# Patient Record
Sex: FEMALE | Race: BLACK OR AFRICAN AMERICAN | NOT HISPANIC OR LATINO | URBAN - METROPOLITAN AREA
[De-identification: names, ages, dates, MRNs, and addresses within clinical notes are randomized per-mention and may not be internally consistent; named-entity substitution may affect disease eponyms.]

---

## 2019-02-01 ENCOUNTER — OUTPATIENT (OUTPATIENT)
Dept: OUTPATIENT SERVICES | Facility: HOSPITAL | Age: 29
LOS: 1 days | End: 2019-02-01
Payer: MEDICAID

## 2019-02-01 PROCEDURE — G9001: CPT

## 2019-02-11 ENCOUNTER — EMERGENCY (EMERGENCY)
Facility: HOSPITAL | Age: 29
LOS: 1 days | Discharge: HOME | End: 2019-02-11
Attending: EMERGENCY MEDICINE | Admitting: EMERGENCY MEDICINE
Payer: MEDICAID

## 2019-02-11 VITALS
SYSTOLIC BLOOD PRESSURE: 126 MMHG | DIASTOLIC BLOOD PRESSURE: 76 MMHG | HEART RATE: 91 BPM | TEMPERATURE: 98 F | RESPIRATION RATE: 16 BRPM | OXYGEN SATURATION: 100 %

## 2019-02-11 LAB
ANION GAP SERPL CALC-SCNC: 15 MMO/L — HIGH (ref 7–14)
BASOPHILS # BLD AUTO: 0.02 K/UL — SIGNIFICANT CHANGE UP (ref 0–0.2)
BASOPHILS NFR BLD AUTO: 0.3 % — SIGNIFICANT CHANGE UP (ref 0–2)
BLD GP AB SCN SERPL QL: POSITIVE — SIGNIFICANT CHANGE UP
BUN SERPL-MCNC: 11 MG/DL — SIGNIFICANT CHANGE UP (ref 7–23)
CALCIUM SERPL-MCNC: 9.6 MG/DL — SIGNIFICANT CHANGE UP (ref 8.4–10.5)
CHLORIDE SERPL-SCNC: 103 MMOL/L — SIGNIFICANT CHANGE UP (ref 98–107)
CO2 SERPL-SCNC: 22 MMOL/L — SIGNIFICANT CHANGE UP (ref 22–31)
CREAT SERPL-MCNC: 0.81 MG/DL — SIGNIFICANT CHANGE UP (ref 0.5–1.3)
EOSINOPHIL # BLD AUTO: 0.12 K/UL — SIGNIFICANT CHANGE UP (ref 0–0.5)
EOSINOPHIL NFR BLD AUTO: 1.9 % — SIGNIFICANT CHANGE UP (ref 0–6)
GLUCOSE SERPL-MCNC: 111 MG/DL — HIGH (ref 70–99)
HCG SERPL-ACNC: 1323 MIU/ML — SIGNIFICANT CHANGE UP
HCT VFR BLD CALC: 37.7 % — SIGNIFICANT CHANGE UP (ref 34.5–45)
HGB BLD-MCNC: 11.8 G/DL — SIGNIFICANT CHANGE UP (ref 11.5–15.5)
IMM GRANULOCYTES NFR BLD AUTO: 0 % — SIGNIFICANT CHANGE UP (ref 0–1.5)
LYMPHOCYTES # BLD AUTO: 1.92 K/UL — SIGNIFICANT CHANGE UP (ref 1–3.3)
LYMPHOCYTES # BLD AUTO: 31 % — SIGNIFICANT CHANGE UP (ref 13–44)
MCHC RBC-ENTMCNC: 26.8 PG — LOW (ref 27–34)
MCHC RBC-ENTMCNC: 31.3 % — LOW (ref 32–36)
MCV RBC AUTO: 85.7 FL — SIGNIFICANT CHANGE UP (ref 80–100)
MONOCYTES # BLD AUTO: 0.59 K/UL — SIGNIFICANT CHANGE UP (ref 0–0.9)
MONOCYTES NFR BLD AUTO: 9.5 % — SIGNIFICANT CHANGE UP (ref 2–14)
NEUTROPHILS # BLD AUTO: 3.55 K/UL — SIGNIFICANT CHANGE UP (ref 1.8–7.4)
NEUTROPHILS NFR BLD AUTO: 57.3 % — SIGNIFICANT CHANGE UP (ref 43–77)
NRBC # FLD: 0 K/UL — LOW (ref 25–125)
PLATELET # BLD AUTO: 303 K/UL — SIGNIFICANT CHANGE UP (ref 150–400)
PMV BLD: 10.2 FL — SIGNIFICANT CHANGE UP (ref 7–13)
POTASSIUM SERPL-MCNC: 3.9 MMOL/L — SIGNIFICANT CHANGE UP (ref 3.5–5.3)
POTASSIUM SERPL-SCNC: 3.9 MMOL/L — SIGNIFICANT CHANGE UP (ref 3.5–5.3)
RBC # BLD: 4.4 M/UL — SIGNIFICANT CHANGE UP (ref 3.8–5.2)
RBC # FLD: 13.6 % — SIGNIFICANT CHANGE UP (ref 10.3–14.5)
RH IG SCN BLD-IMP: NEGATIVE — SIGNIFICANT CHANGE UP
SODIUM SERPL-SCNC: 140 MMOL/L — SIGNIFICANT CHANGE UP (ref 135–145)
WBC # BLD: 6.2 K/UL — SIGNIFICANT CHANGE UP (ref 3.8–10.5)
WBC # FLD AUTO: 6.2 K/UL — SIGNIFICANT CHANGE UP (ref 3.8–10.5)

## 2019-02-11 PROCEDURE — 86077 PHYS BLOOD BANK SERV XMATCH: CPT

## 2019-02-11 PROCEDURE — 76830 TRANSVAGINAL US NON-OB: CPT | Mod: 26

## 2019-02-11 PROCEDURE — 99285 EMERGENCY DEPT VISIT HI MDM: CPT

## 2019-02-11 NOTE — ED ADULT NURSE NOTE - OBJECTIVE STATEMENT
Pt presents to intake, A&Ox4, ambulatory w/o assistance, here for evaluation of vaginal bleeding w/ clots since last night, pt states she is 7wks pregnant, denies soaking pads only spotting. Denies chest pain, shortness of breath, palpitations, diaphoresis, headaches, fevers, dizziness, nausea, vomiting, diarrhea, or urinary symptoms at this time. Labs drawn and sent, call bell in reach, warm blanket provided, bed in lowest position, side rails up x2. Will continue to monitor.

## 2019-02-11 NOTE — ED PROVIDER NOTE - PROGRESS NOTE DETAILS
Pt has picture of sono from [facility] showing IUP. BHCG is going down, rh negative but received rhogam at [facility]. Precautions reviewed. Nicolasa: Pt signed out to me by Dr Donovan pending GYN evaluation. Pt cleared for d/c by GYn attending. will d/c home to f/u as an outpatient.

## 2019-02-11 NOTE — ED ADULT TRIAGE NOTE - CHIEF COMPLAINT QUOTE
pt approx 8 weeks pregnant with +IUP c/o vaginal bleeding last night with clots present- pt currently denies any vaginal bleeding but wants to r/o miscarriage- denies all medical complaints at this time

## 2019-02-11 NOTE — ED PROVIDER NOTE - OBJECTIVE STATEMENT
30 y/o F with no significant PMHx presents to ED with vaginal bleeding since last night. Pt is approx. 8 weeks pregnant and states yesterday she had vaginal bleeding. Pt notes that there were blood clots present and she was bleeding more than usual. LMP December 16th. Pt denies any fever, chills , diarrhea, or other complaints. Pt is RH- and received Rhogam injection on 2/6. Pt also had blood work performed Feb 6th and hormone level was 4,135.

## 2019-02-11 NOTE — ED PROVIDER NOTE - MEDICAL DECISION MAKING DETAILS
Pt with vaginal bleeding, seen at another hospital, received rhogam, had labs, Beta HCG is going down. OB consulted.

## 2019-02-12 VITALS
RESPIRATION RATE: 16 BRPM | DIASTOLIC BLOOD PRESSURE: 65 MMHG | HEART RATE: 90 BPM | OXYGEN SATURATION: 98 % | TEMPERATURE: 98 F | SYSTOLIC BLOOD PRESSURE: 123 MMHG

## 2019-02-12 NOTE — CONSULT NOTE ADULT - SUBJECTIVE AND OBJECTIVE BOX
R2 GYN ED CONSULT NOTE    SUBJECTIVE:    28yo  w/ at 8w2d by LMP 18 presents to [facility] ED w/ VB.  Pt reports that overnight she had heavy VB w/ passage of large clots a/w abdominal cramping.  She says today her VB has mostly resolved.  She was seen on  at [facility] for spotting and at that time she was found to have bHCG 4135 and TVUS showed a 6wk sized sIUP w/ an FH.  Pt has pictures of the sonogram showing sIUP on her phone.  At that time she was found to be Rh Negative and was given Rhogam for threatened Ab.   Today the pt denies fever, chills, nausea, vomiting, diarrhea, headache, constipation, dizzyness, syncope, chest pain, palpitations, shortness of breath, dysuria, urgency, frequency, abdominal/pelvic pain.  She refuses pelvic exam and says her VB is very little.  This was a desired pregnancy.      Primary OB/GYN: none  OBH:   GYNH: denies hx of fibroids, ov cysts, abnl paps, sti  PMSH: migraines  MEDS: advil occasionally  ALL: nkda  SOCH: denies t/e/d; safe at home  FAMH: denies fam hx of breast/uterine/ovarian/colon cancer  ROS: negative except per hpi    OBJECTIVE:    VITAL SIGNS:  Vital Signs Last 24 Hrs  T(C): 36.7 (2019 19:03), Max: 36.7 (2019 19:03)  T(F): 98 (2019 19:03), Max: 98 (2019 19:03)  HR: 91 (2019 19:03) (91 - 91)  BP: 126/76 (2019 19:03) (126/76 - 126/76)  BP(mean): --  RR: 16 (2019 19:03) (16 - 16)  SpO2: 100% (2019 19:03) (100% - 100%)    CAPILLARY BLOOD GLUCOSE            PHYSICAL EXAM:  GEN: NAD, A&Ox3  CV: RRR, no m/g/r  LUNGS: CTAB  ABD: soft, NT, ND, +BS  SPECULUM:  PELVIC:  EXT: WWP, no edema/TTP    LABS:                        11.8   6.20  )-----------( 303      ( 2019 21:53 )             37.7   baso 0.3    eos 1.9    imm gran 0      lymph 31.0   mono 9.5    poly 57.3           140  |  103  |  11  ----------------------------<  111<H>  3.9   |  22  |  0.81    Ca    9.6      2019 21:53            RADIOLOGY:    ASSESSMENT:    RECOMMENDATIONS: R2 GYN ED CONSULT NOTE    SUBJECTIVE:    28yo  w/ at 8w2d by LMP 18 presents to [facility] ED w/ VB.  Pt reports that overnight she had heavy VB w/ passage of large clots a/w abdominal cramping.  She says today her VB has mostly resolved.  She was seen on  at [facility] for spotting and at that time she was found to have bHCG 4135 and TVUS showed a 6wk sized sIUP w/ an FH.  Pt has pictures of the sonogram showing sIUP on her phone.  At that time she was found to be Rh Negative and was given Rhogam for threatened Ab.   Today the pt denies fever, chills, nausea, vomiting, diarrhea, headache, constipation, dizzyness, syncope, chest pain, palpitations, shortness of breath, dysuria, urgency, frequency, abdominal/pelvic pain.  She refuses pelvic exam and says her VB is very little.  This was a desired pregnancy.      Primary OB/GYN: none  OBH:   GYNH: denies hx of fibroids, ov cysts, abnl paps, sti  PMSH: migraines  MEDS: advil occasionally  ALL: nkda  SOCH: denies t/e/d; safe at home  FAMH: denies fam hx of breast/uterine/ovarian/colon cancer  ROS: negative except per hpi    OBJECTIVE:    VITAL SIGNS:  Vital Signs Last 24 Hrs  T(C): 36.7 (2019 19:03), Max: 36.7 (2019 19:03)  T(F): 98 (2019 19:03), Max: 98 (2019 19:03)  HR: 91 (2019 19:03) (91 - 91)  BP: 126/76 (2019 19:03) (126/76 - 126/76)  BP(mean): --  RR: 16 (2019 19:03) (16 - 16)  SpO2: 100% (2019 19:03) (100% - 100%)    PHYSICAL EXAM:  GEN: NAD, A&Ox3  CV: RRR, no m/g/r  LUNGS: CTAB  ABD: soft, NT, ND, +BS  PELVIC: deferred per pt request  EXT: WWP, no edema/TTP    LABS:                        11.8   6.20  )-----------( 303      ( 2019 21:53 )             37.7   baso 0.3    eos 1.9    imm gran 0      lymph 31.0   mono 9.5    poly 57.3           140  |  103  |  11  ----------------------------<  111<H>  3.9   |  22  |  0.81    Ca    9.6      2019 21:53    HCG Quantitative, Serum (19 @ 21:53)    HCG Quantitative, Serum: 1323    Type + Screen (19 @ 21:07)    ABO Interpretation: O    Rh Interpretation: Negative    Antibody Screen: Positive      RADIOLOGY:    < from: US Transvaginal (19 @ 22:56) >  EXAM:  US TRANSVAGINAL    PROCEDURE DATE:  2019   INTERPRETATION:  CLINICAL INFORMATION: Evaluate for ectopic pregnancy. Patient is 8 weeks pregnant. Presenting with vaginal bleeding with passing of clots. Bleeding during examination. Beta-hCG was not available at the time of this dictation.  LMP: 2018  COMPARISON: None available.  TECHNIQUE:  Endovaginal pelvic sonogram only. Color and Spectral Doppler was performed.  FINDINGS:  Uterus: 7.7 x 5.5 x 5.2 cm. A posterior subserosal fibroid measures up to 2.3 cm.   Endometrium: The endometrium is thickened, measuring up to 14 mm. No intrauterine gestation is seen.  Right ovary: 2.8 x 2.0 x 3.1 cm. There is a right ovarian cyst. Normal flow/waveforms.  Left ovary: 2.4 x 1.8 x 2.7 cm. Within normal limits. Normal flow/waveforms.  Adnexa: Limited evaluation of the adnexa due to extensive bowel gas. No definite adnexal mass is seen.  Fluid: None.  IMPRESSION: No intrauterine or extrauterine pregnancy visualized. Serial beta hCG follow-up is recommended.    CHANDLER MARIA M.D., RADIOLOGY RESIDENT  This document has been electronically signed.  BARBARA DWYER M.D., ATTENDING RADIOLOGIST  This document has been electronically signed. 2019 11:51PM    < end of copied text >

## 2019-02-12 NOTE — CONSULT NOTE ADULT - ASSESSMENT
30yo  w/ at 8w2d by LMP 18 presents to [facility] ED w/ Pregnancy of unknown location.  Pt has sono images of the sIUP with her from [facility].  Taken together with todays imaging findings it is most likely that this represents a complete Ab    RECOMMENDATIONS:   - Pt given contact info for [facility] as well as [facility] and advised to f/u w/in 1 week for repeat bHCG and to establish care    Pt seen and plan d/w Dr. Yang and Dr. Alyssa Miguel MD PGY2

## 2019-02-13 DIAGNOSIS — Z71.89 OTHER SPECIFIED COUNSELING: ICD-10-CM

## 2019-02-20 PROBLEM — Z00.00 ENCOUNTER FOR PREVENTIVE HEALTH EXAMINATION: Status: ACTIVE | Noted: 2019-02-20

## 2019-02-25 ENCOUNTER — RESULT REVIEW (OUTPATIENT)
Age: 29
End: 2019-02-25

## 2019-02-25 ENCOUNTER — OUTPATIENT (OUTPATIENT)
Dept: OUTPATIENT SERVICES | Facility: HOSPITAL | Age: 29
LOS: 1 days | End: 2019-02-25

## 2019-02-25 ENCOUNTER — APPOINTMENT (OUTPATIENT)
Dept: OBGYN | Facility: HOSPITAL | Age: 29
End: 2019-02-25
Payer: MEDICAID

## 2019-02-25 ENCOUNTER — LABORATORY RESULT (OUTPATIENT)
Age: 29
End: 2019-02-25

## 2019-02-25 VITALS
HEART RATE: 96 BPM | BODY MASS INDEX: 33.5 KG/M2 | HEIGHT: 66 IN | WEIGHT: 208.44 LBS | DIASTOLIC BLOOD PRESSURE: 82 MMHG | SYSTOLIC BLOOD PRESSURE: 137 MMHG

## 2019-02-25 DIAGNOSIS — Z01.419 ENCOUNTER FOR GYNECOLOGICAL EXAMINATION (GENERAL) (ROUTINE) W/OUT ABNORMAL FINDINGS: ICD-10-CM

## 2019-02-25 DIAGNOSIS — O02.1 MISSED ABORTION: ICD-10-CM

## 2019-02-25 LAB — HCG SERPL-ACNC: < 5 MIU/ML — SIGNIFICANT CHANGE UP

## 2019-02-25 PROCEDURE — ZZZZZ: CPT | Mod: GE

## 2019-02-25 NOTE — REVIEW OF SYSTEMS
[Nl] : Integumentary [Sight Problems] : no sight problems [Dec Hearing] : no hearing loss [Depression] : no depression

## 2019-02-25 NOTE — PHYSICAL EXAM
[Awake] : awake [Alert] : alert [Soft] : soft [Oriented x3] : oriented to person, place, and time [Normal] : uterus [No Bleeding] : there was no active vaginal bleeding [Uterine Adnexae] : were not tender and not enlarged [Acute Distress] : no acute distress [Mass] : no breast mass [Nipple Discharge] : no nipple discharge [Axillary LAD] : no axillary lymphadenopathy [Tender] : non tender [FreeTextEntry4] : White discharge [FreeTextEntry5] : W

## 2019-02-26 DIAGNOSIS — Z01.419 ENCOUNTER FOR GYNECOLOGICAL EXAMINATION (GENERAL) (ROUTINE) WITHOUT ABNORMAL FINDINGS: ICD-10-CM

## 2019-02-26 DIAGNOSIS — O02.1 MISSED ABORTION: ICD-10-CM

## 2019-02-28 DIAGNOSIS — K59.00 CONSTIPATION, UNSPECIFIED: ICD-10-CM

## 2019-02-28 DIAGNOSIS — A59.9 TRICHOMONIASIS, UNSPECIFIED: ICD-10-CM

## 2019-02-28 RX ORDER — METRONIDAZOLE 500 MG/1
500 TABLET ORAL
Qty: 8 | Refills: 0 | Status: ACTIVE | COMMUNITY
Start: 2019-02-28 | End: 1900-01-01

## 2019-02-28 RX ORDER — DOCUSATE SODIUM 100 MG/1
100 CAPSULE ORAL TWICE DAILY
Qty: 90 | Refills: 2 | Status: ACTIVE | COMMUNITY
Start: 2019-02-28 | End: 1900-01-01

## 2019-06-30 ENCOUNTER — EMERGENCY (EMERGENCY)
Facility: HOSPITAL | Age: 29
LOS: 1 days | Discharge: HOME | End: 2019-06-30
Attending: EMERGENCY MEDICINE | Admitting: EMERGENCY MEDICINE
Payer: MEDICAID

## 2019-06-30 VITALS
RESPIRATION RATE: 16 BRPM | OXYGEN SATURATION: 100 % | HEART RATE: 89 BPM | SYSTOLIC BLOOD PRESSURE: 125 MMHG | TEMPERATURE: 98 F | DIASTOLIC BLOOD PRESSURE: 76 MMHG

## 2019-06-30 VITALS
OXYGEN SATURATION: 100 % | HEART RATE: 72 BPM | RESPIRATION RATE: 16 BRPM | DIASTOLIC BLOOD PRESSURE: 60 MMHG | SYSTOLIC BLOOD PRESSURE: 102 MMHG

## 2019-06-30 LAB — HCG SERPL-ACNC: SIGNIFICANT CHANGE UP MIU/ML

## 2019-06-30 PROCEDURE — 99284 EMERGENCY DEPT VISIT MOD MDM: CPT

## 2019-06-30 PROCEDURE — 76830 TRANSVAGINAL US NON-OB: CPT | Mod: 26

## 2019-06-30 RX ORDER — ACETAMINOPHEN 500 MG
975 TABLET ORAL ONCE
Refills: 0 | Status: COMPLETED | OUTPATIENT
Start: 2019-06-30 | End: 2019-06-30

## 2019-06-30 RX ADMIN — Medication 975 MILLIGRAM(S): at 10:44

## 2019-06-30 NOTE — ED PROVIDER NOTE - NSFOLLOWUPCLINICS_GEN_ALL_ED_FT
[facility] Gynecology and Obstetrics  Gynceology/OB  [address] [place] [address]  Phone: [phone]  Fax:   Follow Up Time:

## 2019-06-30 NOTE — ED PROVIDER NOTE - NSFOLLOWUPINSTRUCTIONS_ED_ALL_ED_FT
Follow up with OB/GYN in 1 week    Return to the emergency department for abdominal pain, worsening back pain, vaginal bleeding, or any other new concerning symptoms.

## 2019-06-30 NOTE — ED ADULT TRIAGE NOTE - CHIEF COMPLAINT QUOTE
Pt states she's pregnant and was told by a clinic she had to have "confirmation of pregnancy" before she could make an appointment there. Denies complaints. LMP 5/27

## 2019-06-30 NOTE — ED PROVIDER NOTE - PHYSICAL EXAMINATION
General: A&Ox3, well nourished, no acute distress  HENT: NC/AT. Posterior oropharynx clear. Patent airway  Eyes: PERRL, EOMI  CV: RRR, no m/r/g. 2+ peripheral pulses. Extremities are warm and well perfused.  Respiratory: CTAB no w/r/r. No respiratory distress.  Abdominal: soft, non-distended, non-tender, no rebound, guarding, or rigidity  Neuro: No focal deficits  Skin: no rashes  Psych: normal mood and affect

## 2019-06-30 NOTE — ED ADULT NURSE REASSESSMENT NOTE - NS ED NURSE REASSESS COMMENT FT1
pt evaliuated by md,awaits  ed attending  md to evaluate. pt alert,oriented x3. denies c/o cramping,bleeding. states  back discomfort,refuses tylenol as ordered,md aware. ,blds sent. will continue to monitor,lmp 5/25. home positive preg test 1 wk ago

## 2019-06-30 NOTE — ED PROVIDER NOTE - ATTENDING CONTRIBUTION TO CARE
PRABHU MOLINA MD: Reviewed and agree with the HPI as documented below:   29F LMP 19 presents with possible pregnancy (). She had a positive pregnancy test 1 week ago then again yesterday. She was contacted a clinic for an appointment and was told she needed a confirmatory test from a [facility] care provider prior to being seen. Patient denies abdominal pain/vomiting/fever/vaginal bleeding/vaginal discharge. Does endorse non-focal lower back pain for 1 week which is worse with standing and movement, better at rest.    PHYSICAL EXAM:  Vital signs reviewed.  GENERAL: Patient is awake and alert and in no acute distress.  Non-toxic appearing.  A+Ox4  HEAD:  Airway patent.  No oropharyngeal edema.  No stridor.  Auricles are normal.    EYES: EOM grossly intact, conjunctiva non-injected and sclera clear  NECK: Supple, No vertebral point tenderness to palpation.  CHEST/LUNG: Lungs clear to auscultation bilaterally; no wheeze, no rhonchi,  no rales.    HEART: Regular rate and rhythm;   ABDOMEN: Soft, non-tender to palpation.  No rebound/no guarding.  Bowel sounds present x 4.   MSK/EXTREMITIES: No clubbing or cyanosis. Back is nontender, with no vertebral point tenderness to palpation, no CVAT.  Moving all 4 extremities.     NEURO: Neurologically grossly intact.   No obvious deficits.   PSYCH: Psychiatrically normal mood and affect.  No apparent risk to self or others.       DR. MARTI, ATTENDING MD:    I performed a face to face bedside interview with patient regarding history of present illness, review of symptoms and past medical history. I completed an independent physical exam.  I have discussed patient's plan of care with the team of health care providers.   I agree with note as stated above, having amended the EMR as needed to reflect my findings. I have discussed the assessment and plan of care.  This includes during the time I functioned as the attending physician for this patient.

## 2019-06-30 NOTE — ED PROVIDER NOTE - OBJECTIVE STATEMENT
29F  p/w possible pregnancy. She had a positive pregnancy test 1 week ago then again yesterday. She was contacted a clinic for an appointment and was told she needed a confirmatory test from a [facility] care provider prior to being seen. Patient denies abdominal pain/vomiting/fever/vaginal bleeding/vaginal discharge. Does endorse non-focal lower back pain for 1 week which is worse with standing and movement, better at rest. 29F  LMP 19 p/w possible pregnancy. She had a positive pregnancy test 1 week ago then again yesterday. She was contacted a clinic for an appointment and was told she needed a confirmatory test from a [facility] care provider prior to being seen. Patient denies abdominal pain/vomiting/fever/vaginal bleeding/vaginal discharge. Does endorse non-focal lower back pain for 1 week which is worse with standing and movement, better at rest.

## 2019-06-30 NOTE — ED PROVIDER NOTE - CLINICAL SUMMARY MEDICAL DECISION MAKING FREE TEXT BOX
Jonathan Weil, PGY2 - HCG and TVUS to assess early pregnancy. Jonathan Weil, PGY2 - HCG and TVUS to assess early pregnancy.  Beta HCG will quantify the hormone related to pregnancy and allow for a determination if the level is consistent with the patient's dates based on LMP and possibly ultrasound.  High or low beta HCG can be concerning for a hydatiform mole versus an ectopic pregnancy versus multiple gestation pregnancy versus possible lab error.   Transvaginal ultrasound to check for IUP versus empty uterus, possible adnexal mass, ovarian enlargement, ovarian blood flow to check for torsion.  TVUS can also check for cervical opening indicating an incomplete versus inevitable .  TVUS can also demonstrate free fluid which can be small amount of physiologic fluid or moderate to large amount of free which can be an indication of ruptured ectopic pregnancy.

## 2019-08-20 ENCOUNTER — EMERGENCY (EMERGENCY)
Facility: HOSPITAL | Age: 29
LOS: 1 days | Discharge: HOME | End: 2019-08-20
Attending: EMERGENCY MEDICINE | Admitting: EMERGENCY MEDICINE
Payer: MEDICAID

## 2019-08-20 VITALS
DIASTOLIC BLOOD PRESSURE: 68 MMHG | HEART RATE: 94 BPM | SYSTOLIC BLOOD PRESSURE: 120 MMHG | OXYGEN SATURATION: 100 % | RESPIRATION RATE: 18 BRPM | TEMPERATURE: 98 F

## 2019-08-20 VITALS
TEMPERATURE: 98 F | OXYGEN SATURATION: 100 % | DIASTOLIC BLOOD PRESSURE: 57 MMHG | HEART RATE: 89 BPM | RESPIRATION RATE: 16 BRPM | SYSTOLIC BLOOD PRESSURE: 104 MMHG

## 2019-08-20 LAB
ALBUMIN SERPL ELPH-MCNC: 3.7 G/DL — SIGNIFICANT CHANGE UP (ref 3.3–5)
ALP SERPL-CCNC: 53 U/L — SIGNIFICANT CHANGE UP (ref 40–120)
ALT FLD-CCNC: 21 U/L — SIGNIFICANT CHANGE UP (ref 4–33)
ANION GAP SERPL CALC-SCNC: 17 MMO/L — HIGH (ref 7–14)
APTT BLD: 25.1 SEC — LOW (ref 27.5–36.3)
AST SERPL-CCNC: 17 U/L — SIGNIFICANT CHANGE UP (ref 4–32)
BASOPHILS # BLD AUTO: 0.02 K/UL — SIGNIFICANT CHANGE UP (ref 0–0.2)
BASOPHILS NFR BLD AUTO: 0.3 % — SIGNIFICANT CHANGE UP (ref 0–2)
BILIRUB SERPL-MCNC: < 0.2 MG/DL — LOW (ref 0.2–1.2)
BLD GP AB SCN SERPL QL: NEGATIVE — SIGNIFICANT CHANGE UP
BUN SERPL-MCNC: 9 MG/DL — SIGNIFICANT CHANGE UP (ref 7–23)
CALCIUM SERPL-MCNC: 9.5 MG/DL — SIGNIFICANT CHANGE UP (ref 8.4–10.5)
CHLORIDE SERPL-SCNC: 101 MMOL/L — SIGNIFICANT CHANGE UP (ref 98–107)
CO2 SERPL-SCNC: 17 MMOL/L — LOW (ref 22–31)
CREAT SERPL-MCNC: 0.62 MG/DL — SIGNIFICANT CHANGE UP (ref 0.5–1.3)
EOSINOPHIL # BLD AUTO: 0.07 K/UL — SIGNIFICANT CHANGE UP (ref 0–0.5)
EOSINOPHIL NFR BLD AUTO: 0.9 % — SIGNIFICANT CHANGE UP (ref 0–6)
GLUCOSE SERPL-MCNC: 87 MG/DL — SIGNIFICANT CHANGE UP (ref 70–99)
HCG SERPL-ACNC: SIGNIFICANT CHANGE UP MIU/ML
HCT VFR BLD CALC: 33.9 % — LOW (ref 34.5–45)
HGB BLD-MCNC: 11.1 G/DL — LOW (ref 11.5–15.5)
IMM GRANULOCYTES NFR BLD AUTO: 0.1 % — SIGNIFICANT CHANGE UP (ref 0–1.5)
INR BLD: 1.1 — SIGNIFICANT CHANGE UP (ref 0.88–1.17)
LYMPHOCYTES # BLD AUTO: 1.88 K/UL — SIGNIFICANT CHANGE UP (ref 1–3.3)
LYMPHOCYTES # BLD AUTO: 25 % — SIGNIFICANT CHANGE UP (ref 13–44)
MCHC RBC-ENTMCNC: 28.1 PG — SIGNIFICANT CHANGE UP (ref 27–34)
MCHC RBC-ENTMCNC: 32.7 % — SIGNIFICANT CHANGE UP (ref 32–36)
MCV RBC AUTO: 85.8 FL — SIGNIFICANT CHANGE UP (ref 80–100)
MONOCYTES # BLD AUTO: 0.69 K/UL — SIGNIFICANT CHANGE UP (ref 0–0.9)
MONOCYTES NFR BLD AUTO: 9.2 % — SIGNIFICANT CHANGE UP (ref 2–14)
NEUTROPHILS # BLD AUTO: 4.85 K/UL — SIGNIFICANT CHANGE UP (ref 1.8–7.4)
NEUTROPHILS NFR BLD AUTO: 64.5 % — SIGNIFICANT CHANGE UP (ref 43–77)
NRBC # FLD: 0 K/UL — SIGNIFICANT CHANGE UP (ref 0–0)
PLATELET # BLD AUTO: 226 K/UL — SIGNIFICANT CHANGE UP (ref 150–400)
PMV BLD: 10.5 FL — SIGNIFICANT CHANGE UP (ref 7–13)
POTASSIUM SERPL-MCNC: 3.6 MMOL/L — SIGNIFICANT CHANGE UP (ref 3.5–5.3)
POTASSIUM SERPL-SCNC: 3.6 MMOL/L — SIGNIFICANT CHANGE UP (ref 3.5–5.3)
PROT SERPL-MCNC: 7.2 G/DL — SIGNIFICANT CHANGE UP (ref 6–8.3)
PROTHROM AB SERPL-ACNC: 12.6 SEC — SIGNIFICANT CHANGE UP (ref 9.8–13.1)
RBC # BLD: 3.95 M/UL — SIGNIFICANT CHANGE UP (ref 3.8–5.2)
RBC # FLD: 14.1 % — SIGNIFICANT CHANGE UP (ref 10.3–14.5)
RH IG SCN BLD-IMP: NEGATIVE — SIGNIFICANT CHANGE UP
SODIUM SERPL-SCNC: 135 MMOL/L — SIGNIFICANT CHANGE UP (ref 135–145)
WBC # BLD: 7.52 K/UL — SIGNIFICANT CHANGE UP (ref 3.8–10.5)
WBC # FLD AUTO: 7.52 K/UL — SIGNIFICANT CHANGE UP (ref 3.8–10.5)

## 2019-08-20 PROCEDURE — 76817 TRANSVAGINAL US OBSTETRIC: CPT | Mod: 26

## 2019-08-20 PROCEDURE — 99284 EMERGENCY DEPT VISIT MOD MDM: CPT

## 2019-08-20 NOTE — ED PROVIDER NOTE - PHYSICAL EXAMINATION
GENERAL: no acute distress, non-toxic appearing  HEAD: normocephalic, atraumatic  HEENT: normal conjunctiva, oral mucosa moist, neck supple  CARDIAC: regular rate and rhythm, normal S1 and S2,  no appreciable murmurs  PULM: clear to ascultation bilaterally, no crackles, rales, rhonchi, or wheezing  GI: abdomen nondistended, soft, nontender, no guarding or rebound tenderness  NEURO: alert and oriented x 3, normal speech, PERRLA, EOMI, no focal motor or sensory deficits, nonantalgic gait  MSK: no visible deformities, no peripheral edema, calf tenderness/redness/swelling  SKIN: no visible rashes, dry, well-perfused  PSYCH: appropriate mood and affect  :    Chaperone: RODRIGO Oseguera GENERAL: no acute distress, non-toxic appearing  HEAD: normocephalic, atraumatic  HEENT: normal conjunctiva, oral mucosa moist, neck supple  CARDIAC: regular rate and rhythm, normal S1 and S2,  no appreciable murmurs  PULM: clear to ascultation bilaterally, no crackles, rales, rhonchi, or wheezing  GI: abdomen nondistended, soft, nontender, no guarding or rebound tenderness  NEURO: alert and oriented x 3, normal speech, PERRLA, EOMI, no focal motor or sensory deficits, nonantalgic gait  MSK: no visible deformities, no peripheral edema, calf tenderness/redness/swelling  SKIN: no visible rashes, dry, well-perfused  PSYCH: appropriate mood and affect  : os closed; no adnexal masses or tenderness    Chaperone: RODRIGO Oseguera

## 2019-08-20 NOTE — ED ADULT NURSE NOTE - OBJECTIVE STATEMENT
pt brought into room 16. A&OX4 amb self care pregnant 12 week female presents to the ed today for 5 minutes of vaginal bleeding with no clots. pt endorsing vomiting which she states is normal for her. denies lightheadedness. 20g iv placed in left hand. butterflied for labs. awaiting US

## 2019-08-20 NOTE — ED PROVIDER NOTE - ATTENDING CONTRIBUTION TO CARE
HPI: 30 yo F  12 weeks pregnant (LMP ) with hx of miscarriage at 3 wks (2019 requiring RhoGam at [facility] due to O- blood) presenting with 1 day of vaginal spotting not hemorrhage. Denies significant bleeding or clots. Describes it as spotting. No associated pelvic or abdominal pain. No fever, chills, dysuria, frequency. No nausea, vomiting. no trauma. Has OBGYN on [address] but does not recall name.   EXAM: NAD, Abd soft nontender, pelvis (see resident note).   MDM: concern for vaginal bleeding in pregnancy 12 wks by dates and per pt report. Consider threatened  vs subchor hematoma. Will obtain labs, TVUS and reassess. pt not in pain. Also obtain UA/culture.

## 2019-08-20 NOTE — ED PROVIDER NOTE - NSFOLLOWUPINSTRUCTIONS_ED_ALL_ED_FT
Follow up with your PCP and OBGYN in 24-48 hours.   Do not physically exert yourself or engage in sexual intercourse until seen by your OBGYN doctor.   Return to the ER if you develop any new or worsening symptoms such as fever, bleeding, chest pain, shortness of breath, numbness, weakness, abdominal pain, nausea, vomiting, or visual changes.

## 2019-08-20 NOTE — ED PROVIDER NOTE - NS ED ROS FT
GENERAL: no fever, chills  HEENT: no cough, congestion, odynophagia, dysphagia  CARDIAC: no chest pain, palpitations, lightheadedness  PULM: no dyspnea, wheezing   GI: no abdominal pain, nausea, vomiting, diarrhea, constipation, melena, hematochezia  : + vaginal bleeding  NEURO: no headache, motor weakness, sensory changes  MSK: no joint or muscle pain  SKIN: no rashes, hives  HEME: no active bleeding, bruising

## 2019-08-20 NOTE — ED PROVIDER NOTE - OBJECTIVE STATEMENT
29F  12 weeks pregnant (LMP ) with hx of miscarriage presenting with 1 day of vaginal spotting. Denies significant bleeding or clots. Describes it as spotting. No associated pelvic or abdominal pain. No fever, chills, dysuria, frequency. No nausea, vomiting.

## 2019-08-20 NOTE — ED ADULT TRIAGE NOTE - CHIEF COMPLAINT QUOTE
Pt arrives c/o light vaginal bleeding lasting 5 mins earlier today, denies passing clots, no pelvic cramping, no active bleeding now.  Pt is 12 wks pregnant, rpts had verified IUP via sonogram. Also having lower back discomfort throughout the day.  Comfortable appearing, no acute distress noted.  No significant PMHx.

## 2019-08-20 NOTE — ED ADULT NURSE NOTE - NSIMPLEMENTINTERV_GEN_ALL_ED
Implemented All Universal Safety Interventions:  [place] to call system. Call bell, personal items and telephone within reach. Instruct patient to call for assistance. Room bathroom lighting operational. Non-slip footwear when patient is off stretcher. Physically safe environment: no spills, clutter or unnecessary equipment. Stretcher in lowest position, wheels locked, appropriate side rails in place.

## 2019-08-20 NOTE — ED PROVIDER NOTE - CLINICAL SUMMARY MEDICAL DECISION MAKING FREE TEXT BOX
29F  12 weeks pregnant (LMP ) with hx of miscarriage presenting with 1 day of vaginal spotting. Plan - basics, coags, type and screen, tvus to eval for miscarriage.

## 2020-01-22 ENCOUNTER — EMERGENCY (EMERGENCY)
Facility: HOSPITAL | Age: 30
LOS: 1 days | Discharge: STILL ADMITTED OR ADMINISTRATIVE | End: 2020-01-22
Admitting: EMERGENCY MEDICINE

## 2020-01-22 ENCOUNTER — OUTPATIENT (OUTPATIENT)
Dept: OUTPATIENT SERVICES | Facility: HOSPITAL | Age: 30
LOS: 1 days | Discharge: HOME | End: 2020-01-22
Payer: MEDICAID

## 2020-01-22 VITALS — SYSTOLIC BLOOD PRESSURE: 116 MMHG | DIASTOLIC BLOOD PRESSURE: 57 MMHG | HEART RATE: 88 BPM

## 2020-01-22 VITALS
SYSTOLIC BLOOD PRESSURE: 109 MMHG | RESPIRATION RATE: 20 BRPM | DIASTOLIC BLOOD PRESSURE: 59 MMHG | TEMPERATURE: 99 F | HEART RATE: 96 BPM

## 2020-01-22 VITALS
TEMPERATURE: 98 F | OXYGEN SATURATION: 100 % | DIASTOLIC BLOOD PRESSURE: 71 MMHG | SYSTOLIC BLOOD PRESSURE: 115 MMHG | HEART RATE: 96 BPM | RESPIRATION RATE: 16 BRPM

## 2020-01-22 DIAGNOSIS — O26.899 OTHER SPECIFIED PREGNANCY RELATED CONDITIONS, UNSPECIFIED TRIMESTER: ICD-10-CM

## 2020-01-22 DIAGNOSIS — Z3A.00 WEEKS OF GESTATION OF PREGNANCY NOT SPECIFIED: ICD-10-CM

## 2020-01-22 LAB
APPEARANCE UR: CLEAR — SIGNIFICANT CHANGE UP
BACTERIA # UR AUTO: SIGNIFICANT CHANGE UP
BILIRUB UR-MCNC: NEGATIVE — SIGNIFICANT CHANGE UP
BLOOD UR QL VISUAL: NEGATIVE — SIGNIFICANT CHANGE UP
COLOR SPEC: YELLOW — SIGNIFICANT CHANGE UP
GLUCOSE UR-MCNC: NEGATIVE — SIGNIFICANT CHANGE UP
HYALINE CASTS # UR AUTO: NEGATIVE — SIGNIFICANT CHANGE UP
KETONES UR-MCNC: SIGNIFICANT CHANGE UP
LEUKOCYTE ESTERASE UR-ACNC: SIGNIFICANT CHANGE UP
NITRITE UR-MCNC: NEGATIVE — SIGNIFICANT CHANGE UP
PH UR: 7 — SIGNIFICANT CHANGE UP (ref 5–8)
PROT UR-MCNC: 20 — SIGNIFICANT CHANGE UP
RBC CASTS # UR COMP ASSIST: SIGNIFICANT CHANGE UP (ref 0–?)
SP GR SPEC: 1.02 — SIGNIFICANT CHANGE UP (ref 1–1.04)
SQUAMOUS # UR AUTO: SIGNIFICANT CHANGE UP
UROBILINOGEN FLD QL: NORMAL — SIGNIFICANT CHANGE UP
WBC UR QL: HIGH (ref 0–?)

## 2020-01-22 PROCEDURE — 99214 OFFICE O/P EST MOD 30 MIN: CPT | Mod: 25

## 2020-01-22 PROCEDURE — 76818 FETAL BIOPHYS PROFILE W/NST: CPT | Mod: 26

## 2020-01-22 RX ORDER — BENZOYL PEROXIDE MICRONIZED 5.8 %
0 TOWELETTE (EA) TOPICAL
Qty: 0 | Refills: 0 | DISCHARGE

## 2020-01-22 RX ORDER — ACETAMINOPHEN 500 MG
975 TABLET ORAL ONCE
Refills: 0 | Status: COMPLETED | OUTPATIENT
Start: 2020-01-22 | End: 2020-01-22

## 2020-01-22 RX ADMIN — Medication 975 MILLIGRAM(S): at 11:00

## 2020-01-22 RX ADMIN — Medication 975 MILLIGRAM(S): at 10:22

## 2020-01-22 NOTE — OB PROVIDER TRIAGE NOTE - NSOBPROVIDERNOTE_OBGYN_ALL_OB_FT
30 yo Black female  at 34.5 wks GA with no evidence of PTL at this time  - U/A pending  - Likely muscular skeletal pain   - Dw Dr Mcqueen (OB Attending) if u/a negative  DC Home with labor precautions and Tylenol prn. OB F/U as scheduled 28 yo Black female  at 34.5 wks GA with no evidence of PTL at this time and reassuring maternal fetal testing   -BPP 10/10  - U/A pending  - Likely muscular skeletal pain   - Dw Dr Mcqueen (OB Attending) if u/a negative  DC Home with labor precautions and Tylenol prn. OB F/U as scheduled 30 yo Black female  at 34.5 wks GA with no evidence of PTL at this time and reassuring maternal fetal testing   -BPP 10/10  - U/A with Large leuckocytes and negative nitrites   - Urine C/S sent due to likely contaminated sample  - Likely muscular skeletal pain   - Zechariah Mcqueen (OB Attending)   -DC Home with labor precautions and Tylenol prn. OB F/U as scheduled  - Will contact patient with Urine C/S results if abnormal only as Zechariah Mcqueen

## 2020-01-22 NOTE — OB PROVIDER TRIAGE NOTE - NSHPLABSRESULTS_GEN_ALL_CORE
U/A, C/S U/A, C/S    FHR; cat 1, reactive  TOCO: NO CTX U/A, C/S    FHR; cat 1, reactive  TOCO: NO CTX    Urinalysis Basic - ( 2020 10:07 )    Color: YELLOW / Appearance: CLEAR / S.019 / pH: 7.0  Gluc: NEGATIVE / Ketone: SMALL  / Bili: NEGATIVE / Urobili: NORMAL   Blood: NEGATIVE / Protein: 20 / Nitrite: NEGATIVE   Leuk Esterase: LARGE / RBC: x / WBC x   Sq Epi: x / Non Sq Epi: x / Bacteria: x      - Will send urine C/S since likely contaminated sample and Nitrates negative  -Will contact patient with Urine C/S results only if abnormal and treatment required as DW DR Mcqueen (OB Attending)

## 2020-01-22 NOTE — OB PROVIDER TRIAGE NOTE - ADDITIONAL INSTRUCTIONS
PTL Labor  precautions  Warm compresses to the area prn  Tylenol prn  OB F/U as scheduled with Dr Mcqueen

## 2020-01-22 NOTE — OB PROVIDER TRIAGE NOTE - NSHPPHYSICALEXAM_GEN_ALL_CORE
A/O x 3  NAD  Vital Signs Last 24 Hrs  T(C): 37.0 (22 Jan 2020 09:44), Max: 37 (22 Jan 2020 09:42)  T(F): 98.6 (22 Jan 2020 09:44), Max: 98.6 (22 Jan 2020 09:42)  HR: 96 (22 Jan 2020 09:45) (96 - 96)  BP: 109/59 (22 Jan 2020 09:45) (109/59 - 115/71)  BP(mean): --  RR: 20 (22 Jan 2020 09:42) (16 - 20)  SpO2: 100% (22 Jan 2020 09:12) (100% - 100%)    Heart: RRR  Lungs: BCTA  Abdomen is soft NT and gravid with no ctx palpable  B/L No CVAT illicited  SVE: L/C/P  NST in progress

## 2020-01-22 NOTE — OB PROVIDER TRIAGE NOTE - HISTORY OF PRESENT ILLNESS
30 yo  @ 34.5 wks GA presents from home with c/o mid lower back pain since last night (7/10) on pain scale. Denies any urinary discomfort, CTX, LOF, and reports good FM's. Offers no c/o N/V/D.  PNC: Dr Mcqueen   Denies AP complications thus far  EDC 2020

## 2020-01-23 LAB — SPECIMEN SOURCE: SIGNIFICANT CHANGE UP

## 2020-01-24 LAB — BACTERIA UR CULT: SIGNIFICANT CHANGE UP

## 2020-03-05 ENCOUNTER — INPATIENT (INPATIENT)
Facility: HOSPITAL | Age: 30
LOS: 1 days | Discharge: HOME | End: 2020-03-07
Attending: OBSTETRICS & GYNECOLOGY | Admitting: OBSTETRICS & GYNECOLOGY
Payer: COMMERCIAL

## 2020-03-05 VITALS
HEART RATE: 103 BPM | DIASTOLIC BLOOD PRESSURE: 72 MMHG | SYSTOLIC BLOOD PRESSURE: 125 MMHG | RESPIRATION RATE: 14 BRPM | TEMPERATURE: 98 F

## 2020-03-05 DIAGNOSIS — O26.899 OTHER SPECIFIED PREGNANCY RELATED CONDITIONS, UNSPECIFIED TRIMESTER: ICD-10-CM

## 2020-03-05 DIAGNOSIS — Z3A.00 WEEKS OF GESTATION OF PREGNANCY NOT SPECIFIED: ICD-10-CM

## 2020-03-05 PROBLEM — O03.9 COMPLETE OR UNSPECIFIED SPONTANEOUS ABORTION WITHOUT COMPLICATION: Chronic | Status: ACTIVE | Noted: 2020-01-22

## 2020-03-05 LAB
BASOPHILS # BLD AUTO: 0.01 K/UL — SIGNIFICANT CHANGE UP (ref 0–0.2)
BASOPHILS NFR BLD AUTO: 0.1 % — SIGNIFICANT CHANGE UP (ref 0–2)
BLD GP AB SCN SERPL QL: NEGATIVE — SIGNIFICANT CHANGE UP
EOSINOPHIL # BLD AUTO: 0.05 K/UL — SIGNIFICANT CHANGE UP (ref 0–0.5)
EOSINOPHIL NFR BLD AUTO: 0.7 % — SIGNIFICANT CHANGE UP (ref 0–6)
HCT VFR BLD CALC: 36.9 % — SIGNIFICANT CHANGE UP (ref 34.5–45)
HGB BLD-MCNC: 11.9 G/DL — SIGNIFICANT CHANGE UP (ref 11.5–15.5)
IMM GRANULOCYTES NFR BLD AUTO: 0.4 % — SIGNIFICANT CHANGE UP (ref 0–1.5)
LYMPHOCYTES # BLD AUTO: 1.32 K/UL — SIGNIFICANT CHANGE UP (ref 1–3.3)
LYMPHOCYTES # BLD AUTO: 18.6 % — SIGNIFICANT CHANGE UP (ref 13–44)
MCHC RBC-ENTMCNC: 29.3 PG — SIGNIFICANT CHANGE UP (ref 27–34)
MCHC RBC-ENTMCNC: 32.2 % — SIGNIFICANT CHANGE UP (ref 32–36)
MCV RBC AUTO: 90.9 FL — SIGNIFICANT CHANGE UP (ref 80–100)
MONOCYTES # BLD AUTO: 0.66 K/UL — SIGNIFICANT CHANGE UP (ref 0–0.9)
MONOCYTES NFR BLD AUTO: 9.3 % — SIGNIFICANT CHANGE UP (ref 2–14)
NEUTROPHILS # BLD AUTO: 5.02 K/UL — SIGNIFICANT CHANGE UP (ref 1.8–7.4)
NEUTROPHILS NFR BLD AUTO: 70.9 % — SIGNIFICANT CHANGE UP (ref 43–77)
NRBC # FLD: 0 K/UL — SIGNIFICANT CHANGE UP (ref 0–0)
PLATELET # BLD AUTO: 238 K/UL — SIGNIFICANT CHANGE UP (ref 150–400)
PMV BLD: 11.4 FL — SIGNIFICANT CHANGE UP (ref 7–13)
RBC # BLD: 4.06 M/UL — SIGNIFICANT CHANGE UP (ref 3.8–5.2)
RBC # FLD: 14.4 % — SIGNIFICANT CHANGE UP (ref 10.3–14.5)
RH IG SCN BLD-IMP: NEGATIVE — SIGNIFICANT CHANGE UP
WBC # BLD: 7.09 K/UL — SIGNIFICANT CHANGE UP (ref 3.8–10.5)
WBC # FLD AUTO: 7.09 K/UL — SIGNIFICANT CHANGE UP (ref 3.8–10.5)

## 2020-03-05 RX ORDER — DIBUCAINE 1 %
1 OINTMENT (GRAM) RECTAL EVERY 6 HOURS
Refills: 0 | Status: DISCONTINUED | OUTPATIENT
Start: 2020-03-05 | End: 2020-03-07

## 2020-03-05 RX ORDER — SODIUM CHLORIDE 9 MG/ML
3 INJECTION INTRAMUSCULAR; INTRAVENOUS; SUBCUTANEOUS EVERY 8 HOURS
Refills: 0 | Status: DISCONTINUED | OUTPATIENT
Start: 2020-03-05 | End: 2020-03-07

## 2020-03-05 RX ORDER — TETANUS TOXOID, REDUCED DIPHTHERIA TOXOID AND ACELLULAR PERTUSSIS VACCINE, ADSORBED 5; 2.5; 8; 8; 2.5 [IU]/.5ML; [IU]/.5ML; UG/.5ML; UG/.5ML; UG/.5ML
0.5 SUSPENSION INTRAMUSCULAR ONCE
Refills: 0 | Status: DISCONTINUED | OUTPATIENT
Start: 2020-03-05 | End: 2020-03-07

## 2020-03-05 RX ORDER — OXYCODONE HYDROCHLORIDE 5 MG/1
5 TABLET ORAL
Refills: 0 | Status: DISCONTINUED | OUTPATIENT
Start: 2020-03-05 | End: 2020-03-07

## 2020-03-05 RX ORDER — LANOLIN
1 OINTMENT (GRAM) TOPICAL EVERY 6 HOURS
Refills: 0 | Status: DISCONTINUED | OUTPATIENT
Start: 2020-03-05 | End: 2020-03-07

## 2020-03-05 RX ORDER — OXYTOCIN 10 UNIT/ML
333.33 VIAL (ML) INJECTION
Qty: 20 | Refills: 0 | Status: DISCONTINUED | OUTPATIENT
Start: 2020-03-05 | End: 2020-03-06

## 2020-03-05 RX ORDER — HYDROCORTISONE 1 %
1 OINTMENT (GRAM) TOPICAL EVERY 6 HOURS
Refills: 0 | Status: DISCONTINUED | OUTPATIENT
Start: 2020-03-05 | End: 2020-03-07

## 2020-03-05 RX ORDER — ACETAMINOPHEN 500 MG
975 TABLET ORAL
Refills: 0 | Status: DISCONTINUED | OUTPATIENT
Start: 2020-03-05 | End: 2020-03-07

## 2020-03-05 RX ORDER — SODIUM CHLORIDE 9 MG/ML
1000 INJECTION, SOLUTION INTRAVENOUS
Refills: 0 | Status: DISCONTINUED | OUTPATIENT
Start: 2020-03-05 | End: 2020-03-05

## 2020-03-05 RX ORDER — BENZOCAINE 10 %
1 GEL (GRAM) MUCOUS MEMBRANE EVERY 6 HOURS
Refills: 0 | Status: DISCONTINUED | OUTPATIENT
Start: 2020-03-05 | End: 2020-03-07

## 2020-03-05 RX ORDER — PRAMOXINE HYDROCHLORIDE 150 MG/15G
1 AEROSOL, FOAM RECTAL EVERY 4 HOURS
Refills: 0 | Status: DISCONTINUED | OUTPATIENT
Start: 2020-03-05 | End: 2020-03-07

## 2020-03-05 RX ORDER — OXYTOCIN 10 UNIT/ML
2 VIAL (ML) INJECTION
Qty: 30 | Refills: 0 | Status: DISCONTINUED | OUTPATIENT
Start: 2020-03-05 | End: 2020-03-05

## 2020-03-05 RX ORDER — OXYTOCIN 10 UNIT/ML
333.33 VIAL (ML) INJECTION
Qty: 20 | Refills: 0 | Status: DISCONTINUED | OUTPATIENT
Start: 2020-03-05 | End: 2020-03-05

## 2020-03-05 RX ORDER — OXYCODONE HYDROCHLORIDE 5 MG/1
5 TABLET ORAL ONCE
Refills: 0 | Status: DISCONTINUED | OUTPATIENT
Start: 2020-03-05 | End: 2020-03-07

## 2020-03-05 RX ORDER — SODIUM CHLORIDE 9 MG/ML
1000 INJECTION, SOLUTION INTRAVENOUS
Refills: 0 | Status: COMPLETED | OUTPATIENT
Start: 2020-03-05 | End: 2020-03-05

## 2020-03-05 RX ORDER — SIMETHICONE 80 MG/1
80 TABLET, CHEWABLE ORAL EVERY 4 HOURS
Refills: 0 | Status: DISCONTINUED | OUTPATIENT
Start: 2020-03-05 | End: 2020-03-07

## 2020-03-05 RX ORDER — DIPHENHYDRAMINE HCL 50 MG
25 CAPSULE ORAL EVERY 6 HOURS
Refills: 0 | Status: DISCONTINUED | OUTPATIENT
Start: 2020-03-05 | End: 2020-03-07

## 2020-03-05 RX ORDER — IBUPROFEN 200 MG
600 TABLET ORAL EVERY 6 HOURS
Refills: 0 | Status: COMPLETED | OUTPATIENT
Start: 2020-03-05 | End: 2021-02-01

## 2020-03-05 RX ORDER — AER TRAVELER 0.5 G/1
1 SOLUTION RECTAL; TOPICAL EVERY 4 HOURS
Refills: 0 | Status: DISCONTINUED | OUTPATIENT
Start: 2020-03-05 | End: 2020-03-07

## 2020-03-05 RX ORDER — MAGNESIUM HYDROXIDE 400 MG/1
30 TABLET, CHEWABLE ORAL
Refills: 0 | Status: DISCONTINUED | OUTPATIENT
Start: 2020-03-05 | End: 2020-03-07

## 2020-03-05 RX ORDER — KETOROLAC TROMETHAMINE 30 MG/ML
30 SYRINGE (ML) INJECTION ONCE
Refills: 0 | Status: DISCONTINUED | OUTPATIENT
Start: 2020-03-05 | End: 2020-03-06

## 2020-03-05 RX ORDER — GLYCERIN ADULT
1 SUPPOSITORY, RECTAL RECTAL AT BEDTIME
Refills: 0 | Status: DISCONTINUED | OUTPATIENT
Start: 2020-03-05 | End: 2020-03-07

## 2020-03-05 RX ADMIN — Medication 1000 MILLIUNIT(S)/MIN: at 20:17

## 2020-03-05 RX ADMIN — SODIUM CHLORIDE 999 MILLILITER(S): 9 INJECTION, SOLUTION INTRAVENOUS at 23:07

## 2020-03-05 NOTE — OB PROVIDER H&P - HISTORY OF PRESENT ILLNESS
30 y.o. Black patient  DMITRY 2020 @ 40.6 weeks presents from scheduled prenatal appointment for admission / advanced exam of 5cm. Pt denies LOF, VB. States ctx q 5-10 mins and +FM and uncomplicated antepartum course.

## 2020-03-05 NOTE — CHART NOTE - NSCHARTNOTEFT_GEN_A_CORE
R1 Note 03-05-20 @ 19:25    Pt examined for progression s/p epidural placement    VITALS:  T(C): 36.8 (03-05-20 @ 13:35), Max: 36.8 (03-05-20 @ 13:35)  HR: 108 (03-05-20 @ 19:04) (86 - 115)  BP: 106/55 (03-05-20 @ 19:00) (96/54 - 143/81)  RR: 16 (03-05-20 @ 13:35) (14 - 16)  SpO2: 98% (03-05-20 @ 19:04) (87% - 100%)    EFM:  mod brayden +accels -decels  [place]: Ctx Q3-4min  VE: 7/100/0, bulging bag    IMPRESSION:   FHR Category:   Additional:    PLAN:  for pit and AROM      d/w Dr. Richard Kamara PGY1

## 2020-03-05 NOTE — OB PROVIDER DELIVERY SUMMARY - NSLACERATRAPAIRDETAILS_OBGYN_ALL_OB_FT
second degree perineal laceration repaired with chromic 2-0 suture with good hemostasis and reapproximation

## 2020-03-05 NOTE — PROGRESS NOTE ADULT - SUBJECTIVE AND OBJECTIVE BOX
Patient was seen and evaluated at bedside. s/p epidural, comfortable. Ctx every 3-5 min, exam unchanged, done shortly before epidural placement.  and 145 with moderate variability, no decels, + accelerations 15x15, category 1, Pitocin started for augmentation of labor.   MD adore

## 2020-03-05 NOTE — PROVIDER CONTACT NOTE (OTHER) - ASSESSMENT
Pt is AOx4 and able to make her needs known. During orthostatic BPs, pt become more tachycardic and with a severe range BP. Pt c/o heart racing, but denies headache, dizziness, nausea.  See flowsheet for vitals.

## 2020-03-05 NOTE — OB PROVIDER TRIAGE NOTE - HISTORY OF PRESENT ILLNESS
30 y.o. Black patient  MDITRY 2020 @ 40.6 weeks presents from scheduled prenatal appointment for admission / advanced exam of 5cm. Pt denies LOF, VB. States ctx q 5-10 mins and +FM and uncomplicated antepartum course.

## 2020-03-05 NOTE — OB NEONATOLOGY/PEDIATRICIAN DELIVERY SUMMARY - NSPEDSNEONOTESA_OBGYN_ALL_OB_FT
Baby is a 40.5 week GA female born to a 31 y/o  mother via . Maternal history uncomplicated. Pregnancy uncomplicated. Maternal blood type O- (Rhogam given on 19). Prenatal labs negative, nonreactive and immune. GBS negative on 2/3. AROM <18hrs with heavy mec. Baby born vigorous and crying spontaneously. Warmed, dried, stimulated. Apgars 9 / 9. EOS 0.06. Mother choosing to breastfeed. She defers Hep B.    GEN: Well appearing, NAD  SKIN: Pink, no jaundice/rash, Georgian spot on buttocks  HEENT: AFOF, Red reflex deferred, no clefts, no ear pits/tags, nares patent  CV: S1S2, RRR, no murmurs  RESP: CTA bilat  ABD: Soft, dried umbilical stump, no masses  : Normal Talon 1  Spine/Anus: Spine straight, no dimples, anus patent  Trunk/Ext: 2+ fem pulses b/l, full ROM, -O/B  NEURO: +suck/pilar/grasp

## 2020-03-05 NOTE — OB PROVIDER H&P - NSOBPROC_OBGYN_ALL_OB
[facility]  [address] [address] [address]               Félix Jensen   2017  8:35 AM   ED    Description:  Male : 1972   Department:  [facility]           Your Care was Coordinated By:     Provider Role From To    Mikey Downing MD Attending Provider 17 0833 --      Reason for Visit     Elbow Pain           Diagnoses this Visit        Comments    Contusion of left elbow, initial encounter    -  Primary     Trauma         Essential hypertension           ED Disposition     None           To Do List           Follow-up Information     Follow up with Timothy Wood MD.    Specialty:  Family Medicine    Why:  As needed    Contact information:    [address] [place] [address] [id number] [phone]          Follow up with [facility].    Specialty:  Emergency Medicine    Why:  As needed, If symptoms worsen    Contact information:    [address] [address] [address] [phone]       These Medications        Disp Refills Start End    naproxen (NAPROSYN) 500 MG tablet 14 tablet 1 2017    Take 1 tablet (500 mg total) by mouth 2 (two) times daily as needed (For pain/ inflammation). - Oral    Pharmacy: [facility] [address] [place] [address] Ph #: [phone]         [facility]     [facility] Care [facility] - 24 Assistance  Unless otherwise directed by your provider, please contact [facility], our nurse care line that is available for  assistance.     Registered nurses in the [facility] provide: appointment scheduling, clinical advisement, health education, and other advisory services.  Call: [phone] (toll free)               Medications           Message regarding Medications     Verify the changes and/or additions to your medication regime listed below are the same as discussed with your clinician today.  If any  "of these changes or additions are incorrect, please notify your healthcare provider.        START taking these NEW medications        Refills    naproxen (NAPROSYN) 500 MG tablet 1    Sig: Take 1 tablet (500 mg total) by mouth 2 (two) times daily as needed (For pain/ inflammation).    Class: Print    Route: Oral      These medications were administered today        Dose Freq    naproxen tablet 500 mg 500 mg ED 1 Time    Sig: Take 1 tablet (500 mg total) by mouth ED 1 Time.    Class: Normal    Route: Oral    acetaminophen tablet 975 mg 975 mg ED 1 Time    Sig: Take 3 tablets (975 mg total) by mouth ED 1 Time.    Class: Normal    Route: Oral           Verify that the below list of medications is an accurate representation of the medications you are currently taking.  If none reported, the list may be blank. If incorrect, please contact your healthcare provider. Carry this list with you in case of emergency.           Current Medications     AMLODIPINE BESYLATE (AMLODIPINE ORAL) Take by mouth.    ATORVASTATIN CALCIUM (LIPITOR ORAL) Take by mouth.    valsartan-hydrochlorothiazide (DIOVAN-HCT) 160-12.5 mg per tablet Take 1 tablet by mouth once daily.    naproxen (NAPROSYN) 500 MG tablet Take 1 tablet (500 mg total) by mouth 2 (two) times daily as needed (For pain/ inflammation).    ondansetron (ZOFRAN-ODT) 4 MG TbDL Take 1 tablet (4 mg total) by mouth every 8 (eight) hours as needed (nausea).    oxycodone-acetaminophen (PERCOCET) 5-325 mg per tablet Take 1-2 tablets by mouth every 4 (four) hours as needed for Pain.           Clinical Reference Information           Your Vitals Were     BP Pulse Temp Resp Height Weight    169/116 (BP Location: Left arm, Patient Position: Sitting) 86 98.6 °F (37 °C) (Oral) 18 5' 9" (1.753 m) 103.4 kg (228 lb)    SpO2 BMI             96% 33.67 kg/m2         Allergies as of 4/13/2017     No Known Allergies      Immunizations Administered on Date of Encounter - 4/13/2017     None      ED " Micro, Lab, POCT     None      ED Imaging Orders     Start Ordered       Status Ordering Provider    04/13/17 0856 04/13/17 0855  X-Ray Elbow Complete Left  1 time imaging      Acknowledged         Discharge Instructions         Understanding Bone Bruise (Bone Contusion)  A bone bruise is an injury to a bone that is less severe than a bone fracture. Bone bruises are fairly common. They can happen to people of all ages. Any type of bone in your body can get a bone bruise. Other injuries often happen along with a bone bruise, such as damage to nearby ligaments.  What happens when a bone is bruised?  Bone is made of different kinds of tissue. The periosteum is a thin layer of tissue that covers most of a bone. Where bones come together, there is usually a layer of cartilage at the edges. The bone here is called subchondral bone. Deep inside the bone is an area called the medulla. It contains the bone marrow and fibrous tissue called trabeculae.  With a bone fracture, all of the trabeculae in a region of bone have broken. But with a bone bruise, an injury only damages some of these trabeculae. An injury might cause blood to build up in the area beneath the periosteum. This causes a subperiosteal hematoma, a type of bone bruise. An injury might also cause bleeding and swelling in the area between your cartilage and the bone beneath it. This causes a subchondral bone bruise. Or bleeding and swelling can occur in the medulla of your bone. This is called an interosseous bone bruise.  What causes a bone bruise?  Injury of any kind can cause a bone bruise. Sports injuries, motor vehicle accidents, or falls from a height can cause them. Twisting injuries that cause joint sprains can also cause a bone bruise. Health conditions like arthritis may also lead to a bone bruise. This is because arthritis causes bone surfaces to grind against each other. Child abuse is another cause of bone bruises.  Symptoms of a bone  bruise  Symptoms of a bone bruise can include:  · Pain and soreness in the injured area  · Swelling in the area and soft tissues around it  · Change in color of the injured area  · Swelling or stiffness of an injured joint  This pain is often more severe and lasts longer than a soft tissue injury. How severe your symptoms are and how long they last depends on how severe the bone bruise is.  Diagnosing a bone bruise  Your healthcare provider will ask you about your medical history and symptoms. He or she will ask how you got your injury. Your provider will examine the injured area to check for pain, bruising, and swelling. After the exam, your health care provider may be able to tell if you have a bone bruise.  A bone bruise doesnt show up on an X-ray. But you may be given an X-ray to rule out a bone fracture. A fracture may need a different kind of treatment. An MRI can confirm a bone bruise. But your healthcare provider will likely only give you an MRI if your symptoms dont get better.  Date Last Reviewed: 3/3/2015  © 0843-5140 [employer]. [address] [place] [address]. All rights reserved. This information is not intended as a substitute for professional medical care. Always follow your healthcare professional's instructions.          Elbow Bruise  You have a bruise (contusion) of your elbow. A bruise causes local pain, swelling, and sometimes bruising. There are no broken bones. This injury takes a few days to a few weeks to heal. A sling may be given to you for comfort and arm support.  You may notice color changes over the skin. It may change from reddish to bluish to greenish or yellowish before the bruising fades. The skin will then go back to its normal color.  Home care  Follow these guidelines when caring for yourself at home.  · Keep your arm elevated to reduce pain and swelling. This is most important during the first 2 days (48 hours) after the injury.  · Put an ice pack on  the injured area. Do this for 20 minutes every 1 to 2 hours the first day. You can make an ice pack by wrapping a plastic bag of ice in a thin towel. You should continue to use the ice pack 3 to 4 times a day for the next 2 days. Then use the ice pack as needed to ease pain and swelling.  · Dont use a heating pad.  · Dont stick a needle into the contusion or bruising to drain it.  · You may use acetaminophen or ibuprofen to control pain, unless another pain medicine was prescribed. If you have chronic liver or kidney disease, talk with your health care provider before using these medicines. Also talk with your provider if youve had a stomach ulcer or GI bleeding.  · If a sling was provided, you may take it off to shower or bathe. Dont wear it for more than 1 week or it may cause joint stiffness.  Follow-up care  Follow up with your health care provider, or as advised, if you are not starting to get better within the next 3 days.  When to seek medical advice  Call your health care provider right away if any of these occur:  · Pain or swelling gets worse  · Redness, red streaks down the arm, warmth, or drainage from the bruise  · Hand or fingers becomes cold, blue, numb, or tingly  · New bruises, and you dont know what caused them  · Contusion doesnt heal  Date Last Reviewed: 2/15/2015  © 8771-7212 [employer]. [address] [place] [address]. All rights reserved. This information is not intended as a substitute for professional medical care. Always follow your healthcare professional's instructions.          Discharge References/Attachments     SLING (ENGLISH)    HIGH BLOOD PRESSURE (HYPERTENSION), DISCHARGE INSTRUCTIONS (ENGLISH)      [facility] Sign-Up     Activating your [facility] account is as easy as 1-2-3!     1) Visit [email or web address], select Sign Up Now, enter this activation code and your date of birth, then select Next.  Activation code not generated  Current Patient Portal  Status: Account disabled      2) Create a username and password to use when you visit [facility] in the future and select a security question in case you lose your password and select Next.    3) Enter your e-mail address and click Sign Up!    Additional Information  If you have questions, please e-mail [email or web address] or call [phone] to talk to our [facility] [employer] staff. Remember, [facility] is NOT to be used for urgent needs. For medical emergencies, dial 911.          [facility] complies with applicable Federal civil rights laws and does not discriminate on the basis of race, color, national origin, age, disability, or sex.        Language Assistance Services     ATTENTION: Language assistance services are available, free of charge. Please call [phone].      ATENCIÓN: Si habla rickañol, tiene a michele disposición servicios gratuitos de asistencia lingüística. Llame al [phone].     CHÚ Ý: N?u b?n nói Ti?ng Vi?t, có các d?ch v? h? tr? ngôn ng? mi?n phí dành cho b?n. G?i s? [phone].         None Done

## 2020-03-05 NOTE — CHART NOTE - NSCHARTNOTEFT_GEN_A_CORE
R1 OB Labor Note    S: Patient seen and examined at bedside for variable deceleration with rectal pressure.     Vital Signs Last 24 Hrs  T(C): 36.8 (05 Mar 2020 13:35), Max: 36.8 (05 Mar 2020 13:35)  T(F): 98.2 (05 Mar 2020 13:35), Max: 98.2 (05 Mar 2020 13:35)  HR: 103 (05 Mar 2020 13:35) (103 - 103)  BP: 125/72 (05 Mar 2020 13:35) (125/72 - 125/72)  BP(mean): --  RR: 16 (05 Mar 2020 13:35) (14 - 16)  SpO2: --    FHT: 150, mod brayden, +accels, no decels  [place]: q4-7m  SVE: 5/90/-3    A/P:   - exam unchanged  - continue with labor   - CTM       ADomney PGY-1  to be d/w Dr. Ramos

## 2020-03-05 NOTE — OB PROVIDER H&P - ASSESSMENT
30 y.o. Black patient  DMITRY 2020 @ 40.6 weeks presents from scheduled prenatal appointment for admission  advanced exam of 5cm. Pt denies LOF, VB. States ctx q 5-10 mins and +FM and uncomplicated antepartum course.    allergies:  NKDA  medications:  prenatal vitamins    med/surg hx:  denies  OBGYN hx:   complete sab x 1    abdomen soft, nontender  taus: cephalic presentation  sve /-3/I  nst in progress    a/p: 30 y.o. Black patient  DMITRY 2020 @ 40.6 weeks in labor    GBS negative 2/3/2020    Discussed with Dr Ramos    pt approved for epidural anesthesia    Discussed with Dr Nicholas Gardner, NP 30 y.o. Black patient  DMITRY 2020 @ 40.6 weeks presents from scheduled prenatal appointment for admission 2/ advanced exam of 5cm. Pt denies LOF, VB. States ctx q 5-10 mins and +FM and uncomplicated antepartum course.    allergies:  NKDA  shellfish - hives  medications:  prenatal vitamins    med/surg hx:  denies  OBGYN hx:   complete sab x 1      abdomen soft, nontender  taus: cephalic presentation  sve /-3/I  nst in progress    a/p: 30 y.o. Black patient  DMITRY 2020 @ 40.6 weeks in labor    GBS negative 2/3/2020    Discussed with Dr Ramos    pt approved for epidural anesthesia    Discussed with Dr Nicholas Gardnre, NP

## 2020-03-05 NOTE — PROGRESS NOTE ADULT - SUBJECTIVE AND OBJECTIVE BOX
Patient was seen and evaluated at bedside at 1520. H&P, vitals, labs, FHT reviewed.  29 yo p0 admitted in active labor. GBS neg, uncomplicated hospital course at  40 6/7 weeks  cervical exam: vtx 5/90/-2, no bulging membranes. Patient is lowell every 2-4 min.  with moderate variability and + accels, category 1. Afebrile.  Plan : continue to labor EFW  3500-4000g. Patient c/o of painful contractions, does not desire analgesia/anaesthesia. She would like to walk in the room   with FHR ck and NST every 30 min. Expect vaginal delivery.  MD adore

## 2020-03-05 NOTE — OB PROVIDER DELIVERY SUMMARY - NSPROVIDERDELIVERYNOTE_OBGYN_ALL_OB_FT
Patient is fully dilated and pushing with contractions. Head delivered on OA presentation, body delivered easily. Baby placed on maternal abdomen. Cord clamped and cut. Baby handed to awaiting peds. APGARS 9/9. Placenta delivered spontaneously and intact with 3 vessel cord. Second degree perineal laceration repaired with chromic 2-0 suture with good hemostasis and reapproximation. Cervix, vagina, rectum, vulva examined and intact. , fetal weight: 3475g.

## 2020-03-05 NOTE — OB PROVIDER TRIAGE NOTE - NSOBPROVIDERNOTE_OBGYN_ALL_OB_FT
30 y.o. Black patient  DMITRY 2020 @ 40.6 weeks presents from scheduled prenatal appointment for admission  advanced exam of 5cm. Pt denies LOF, VB. States ctx q 5-10 mins and +FM and uncomplicated antepartum course.    allergies:  NKDA  medications:  prenatal vitamins    med/surg hx:  denies  OBGYN hx:   complete sab x 1    abdomen soft, nontender  taus: cephalic presentation  sve /-3/I  nst in progress    a/p: 30 y.o. Black patient  DMITRY 2020 @ 40.6 weeks in labor    GBS negative 2/3/2020    Discussed with Dr Ramos    pt approved for epidural anesthesia    Discussed with Dr Nicholas Gardner, NP 30 y.o. Black patient  DMITRY 2020 @ 40.6 weeks presents from scheduled prenatal appointment for admission 2/ advanced exam of 5cm. Pt denies LOF, VB. States ctx q 5-10 mins and +FM and uncomplicated antepartum course.    allergies:  NKDA  shellfish - hives  medications:  prenatal vitamins    med/surg hx:  denies  OBGYN hx:   complete sab x 1    abdomen soft, nontender  taus: cephalic presentation  sve /-3/I  nst in progress    a/p: 30 y.o. Black patient  DMITRY 2020 @ 40.6 weeks in labor    GBS negative 2/3/2020    Discussed with Dr Ramos    pt approved for epidural anesthesia    Discussed with Dr Nicholas Gardner, NP

## 2020-03-06 ENCOUNTER — TRANSCRIPTION ENCOUNTER (OUTPATIENT)
Age: 30
End: 2020-03-06

## 2020-03-06 LAB
BASOPHILS # BLD AUTO: 0.01 K/UL — SIGNIFICANT CHANGE UP (ref 0–0.2)
BASOPHILS NFR BLD AUTO: 0.1 % — SIGNIFICANT CHANGE UP (ref 0–2)
EOSINOPHIL # BLD AUTO: 0 K/UL — SIGNIFICANT CHANGE UP (ref 0–0.5)
EOSINOPHIL NFR BLD AUTO: 0 % — SIGNIFICANT CHANGE UP (ref 0–6)
HCT VFR BLD CALC: 32.8 % — LOW (ref 34.5–45)
HCT VFR BLD CALC: 33.5 % — LOW (ref 34.5–45)
HGB BLD-MCNC: 10.2 G/DL — LOW (ref 11.5–15.5)
HGB BLD-MCNC: 10.9 G/DL — LOW (ref 11.5–15.5)
IMM GRANULOCYTES NFR BLD AUTO: 0.5 % — SIGNIFICANT CHANGE UP (ref 0–1.5)
LYMPHOCYTES # BLD AUTO: 0.72 K/UL — LOW (ref 1–3.3)
LYMPHOCYTES # BLD AUTO: 5.6 % — LOW (ref 13–44)
MCHC RBC-ENTMCNC: 29.1 PG — SIGNIFICANT CHANGE UP (ref 27–34)
MCHC RBC-ENTMCNC: 32.5 % — SIGNIFICANT CHANGE UP (ref 32–36)
MCV RBC AUTO: 89.6 FL — SIGNIFICANT CHANGE UP (ref 80–100)
MONOCYTES # BLD AUTO: 0.75 K/UL — SIGNIFICANT CHANGE UP (ref 0–0.9)
MONOCYTES NFR BLD AUTO: 5.8 % — SIGNIFICANT CHANGE UP (ref 2–14)
NEUTROPHILS # BLD AUTO: 11.41 K/UL — HIGH (ref 1.8–7.4)
NEUTROPHILS NFR BLD AUTO: 88 % — HIGH (ref 43–77)
NRBC # FLD: 0 K/UL — SIGNIFICANT CHANGE UP (ref 0–0)
PLATELET # BLD AUTO: 225 K/UL — SIGNIFICANT CHANGE UP (ref 150–400)
PMV BLD: 10.9 FL — SIGNIFICANT CHANGE UP (ref 7–13)
RBC # BLD FETUS AUTO: 0 — SIGNIFICANT CHANGE UP
RBC # BLD: 3.74 M/UL — LOW (ref 3.8–5.2)
RBC # FLD: 14.4 % — SIGNIFICANT CHANGE UP (ref 10.3–14.5)
T PALLIDUM AB TITR SER: NEGATIVE — SIGNIFICANT CHANGE UP
WBC # BLD: 12.95 K/UL — HIGH (ref 3.8–10.5)
WBC # FLD AUTO: 12.95 K/UL — HIGH (ref 3.8–10.5)

## 2020-03-06 PROCEDURE — 93010 ELECTROCARDIOGRAM REPORT: CPT

## 2020-03-06 RX ORDER — IBUPROFEN 200 MG
600 TABLET ORAL EVERY 6 HOURS
Refills: 0 | Status: DISCONTINUED | OUTPATIENT
Start: 2020-03-06 | End: 2020-03-07

## 2020-03-06 RX ORDER — SODIUM CHLORIDE 9 MG/ML
500 INJECTION, SOLUTION INTRAVENOUS ONCE
Refills: 0 | Status: COMPLETED | OUTPATIENT
Start: 2020-03-06 | End: 2020-03-06

## 2020-03-06 RX ORDER — ACETAMINOPHEN 500 MG
3 TABLET ORAL
Qty: 0 | Refills: 0 | DISCHARGE
Start: 2020-03-06

## 2020-03-06 RX ORDER — IBUPROFEN 200 MG
1 TABLET ORAL
Qty: 0 | Refills: 0 | DISCHARGE
Start: 2020-03-06

## 2020-03-06 RX ADMIN — Medication 600 MILLIGRAM(S): at 12:30

## 2020-03-06 RX ADMIN — SODIUM CHLORIDE 3 MILLILITER(S): 9 INJECTION INTRAMUSCULAR; INTRAVENOUS; SUBCUTANEOUS at 14:00

## 2020-03-06 RX ADMIN — SODIUM CHLORIDE 1000 MILLILITER(S): 9 INJECTION, SOLUTION INTRAVENOUS at 02:25

## 2020-03-06 RX ADMIN — Medication 600 MILLIGRAM(S): at 05:35

## 2020-03-06 RX ADMIN — SODIUM CHLORIDE 3 MILLILITER(S): 9 INJECTION INTRAMUSCULAR; INTRAVENOUS; SUBCUTANEOUS at 06:06

## 2020-03-06 RX ADMIN — Medication 600 MILLIGRAM(S): at 06:35

## 2020-03-06 RX ADMIN — Medication 600 MILLIGRAM(S): at 11:42

## 2020-03-06 RX ADMIN — SODIUM CHLORIDE 3 MILLILITER(S): 9 INJECTION INTRAMUSCULAR; INTRAVENOUS; SUBCUTANEOUS at 00:41

## 2020-03-06 NOTE — DISCHARGE NOTE OB - MEDICATION SUMMARY - MEDICATIONS TO TAKE
I will START or STAY ON the medications listed below when I get home from the hospital:    acetaminophen 325 mg oral tablet  -- 3 tab(s) by mouth   -- Indication: For Encounter for full-term uncomplicated delivery    ibuprofen 600 mg oral tablet  -- 1 tab(s) by mouth every 6 hours  -- Indication: For Encounter for full-term uncomplicated delivery    PNV Prenatal oral tablet  -- 1 tab(s) by mouth once a day  -- Indication: For Encounter for full-term uncomplicated delivery    Iron 100 Plus oral tablet  -- orally 2 times a day  -- Indication: For Encounter for full-term uncomplicated delivery

## 2020-03-06 NOTE — DISCHARGE NOTE OB - CARE PROVIDER_API CALL
Mariely Mcqueen (DO)  Obstetrics and Gynecology  [address] [place] [address]  Phone: [phone]  Fax: [phone]  Follow Up Time:

## 2020-03-06 NOTE — DISCHARGE NOTE OB - MATERIALS PROVIDED
Vaccinations/Breastfeeding Guide and Packet/  Immunization Record/Breastfeeding Log/Back To Sleep Handout/Shaken Baby Prevention Handout/Birth Certificate Instructions/Breastfeeding Mother’s Support Group Information/Guide to Postpartum Care/Discharge Medication Information for Patients and Families Pocket Guide

## 2020-03-06 NOTE — DISCHARGE NOTE OB - HOSPITAL COURSE
P1 s/p  uncomplicated delivery  maternal tachycardia post partum- resolved  d/c home PPD2 in stable condition

## 2020-03-06 NOTE — DISCHARGE NOTE OB - PATIENT PORTAL LINK FT
You can access the FollowMyHealth Patient Portal offered by [facility] by registering at the following website: [email or web address]. By joining [facility]’s FollowMyHealth portal, you will also be able to view your health information using other applications (apps) compatible with our system.

## 2020-03-06 NOTE — CHART NOTE - NSCHARTNOTEFT_GEN_A_CORE
Pt evaluated at bedside for tachycardia postpartum. Pt denies dizziness, lightheadedness, fevers, chills, nausea, vomiting, cp/sob, difficulty breathing, palpitations.    Vital Signs Last 24 Hours  T(C): 37.5 (03-06-20 @ 00:33), Max: 37.5 (03-06-20 @ 00:33)  HR: 116 (03-06-20 @ 00:34) (86 - 138)  BP: 112/61 (03-06-20 @ 00:33) (96/54 - 174/76)  RR: 17 (03-06-20 @ 00:33) (14 - 17)  SpO2: 99% (03-06-20 @ 00:34) (71% - 100%)    Gen: NAD  Heart: tachycardic, regular rhythm  Lungs: CTAB  Abd: soft, mildly distended, non-tender to deep palpation, warm to touch  : minimal lochia on pad, no bleeding with expression  Ext: no swelling, edema, erythema    Plan: Pt immediately postpartum with tachycardia. Could 2/2 acute blood loss anemia vs. early endometritis although pt afebrile at this time. Low concern for PE.  -f/u STAT CBC  -CTM VS  -If no improvement, consider additional IV bolus and EKG    d/w SARA Alejandra PGY4  SARA Atwood PGY1

## 2020-03-06 NOTE — PROGRESS NOTE ADULT - SUBJECTIVE AND OBJECTIVE BOX
29 y/o P1 s/p  PPD#1  pt ambulating, voiding  tolerating reg diet  reports minimal lochia and minimal pain    vitals  /59 P 95 RR17 T97.5 O2 sat 99%RA  cardio ns1s2  lungs ctabv  abdomen firm/non tender uterus at umbilical level  Lext +1 edema bilat/ non tender bilat    A/P  29 y/o P1 s/p  PPD#1- stable  cont post partum care  cont PO pain mngt  ambulation encouraged  discharge planning for PPD#2

## 2020-03-06 NOTE — DISCHARGE NOTE OB - CARE PLAN
Principal Discharge DX:	Vaginal delivery  Goal:	post partum recovery  Assessment and plan of treatment:	P1 s/p   tachycardia immediately post partum resolved  d/c home stable PPD#2

## 2020-03-07 VITALS
RESPIRATION RATE: 16 BRPM | SYSTOLIC BLOOD PRESSURE: 124 MMHG | DIASTOLIC BLOOD PRESSURE: 80 MMHG | HEART RATE: 95 BPM | OXYGEN SATURATION: 99 % | TEMPERATURE: 98 F

## 2020-03-07 NOTE — PROGRESS NOTE ADULT - SUBJECTIVE AND OBJECTIVE BOX
S: 30y PPD#2 Patient doing well. Minimal to moderate lochia. Pain controlled. Voiding. Passing Flatus. Tolerating a regular diet.     O: Vital Signs Last 24 Hrs  T(C): 36.5 (07 Mar 2020 05:06), Max: 36.5 (07 Mar 2020 05:06)  T(F): 97.7 (07 Mar 2020 05:06), Max: 97.7 (07 Mar 2020 05:06)  HR: 95 (07 Mar 2020 05:06) (95 - 95)  BP: 124/80 (07 Mar 2020 05:06) (124/80 - 124/80)  RR: 16 (07 Mar 2020 05:06) (16 - 16)  SpO2: 99% (07 Mar 2020 05:06) (99% - 100%)    Gen: NAD A+Ox3  Abd: soft, NT, ND, fundus firm below umbilicus  Lochia: minimal to moderate  Ext: no tenderness b/l    Labs:                        10.2   x     )-----------( x        ( 06 Mar 2020 06:32 )             32.8       A: 30y PPD#2 s/p CD. Doing well. Baby in NICU for breathing.     Plan: Increase ambulation. Advance diet as tolerated. Tylenol and Motrin q6 hrs. Oxycodone prn.   DVT prophylaxis. Routine postpartum care. Discharge planning.

## 2021-05-27 NOTE — PROVIDER CONTACT NOTE (OTHER) - BACKGROUND
ASSESSMENT AND PLAN:        1. Immunization due pneumonia vaccination given today. Pneumococcal polysaccharide vaccine 23-valent 1 yo or older, subq/IM   2. Major depressive disorder with single episode, remission status unspecified PHQ 9 reviewed Lexapro refilled she is doing well. escitalopram oxalate (LEXAPRO) 10 MG tablet   3. Chronic right shoulder pain continues have chronic shoulder pain however she has no discomfort when I palpate her right shoulder today.  This is secondary to a right humeral fracture.  Refilled naproxen which works well for her pain. naproxen (EC NAPROSYN) 500 MG EC tablet   4. Right leg pain he can walk with the help of a cane she walks around her home.  Refill the naproxen no resting pain    5. Colon cancer screening she agrees to have the [place] guard test done this was explained to her today        There are no discontinued medications.    No follow-ups on file.    CHIEF COMPLAINT:  Chief Complaint   Patient presents with     Depression       HISTORY OF PRESENT ILLNESS:  Main is a 64 y.o. female with chronic right shoulder pain, chronic right leg pain related to previous orthopedic injuries, major depression, and slow transit constipation, who presents to the clinic today for follow up on depression. Main is present with a Tracy . Her PHQ-9 score today is 3, stating that her depression has been well-controlled.    She states to be feeling better though continues to have right leg pain. The patient thinks she is sleeping for 3 hours at night, but is unsure. Her appetite has been okay, and she is eating. Main tries to walk regularly at home though this is limited by her leg pain. Naproxen is helping, and does not seem to bother her stomach.     On review of her immunizations, she is due for pneumonia vaccination. The patient is also due for colon cancer screening.    REVIEW OF SYSTEMS:   GI: No abdominal pain.  Musculoskeletal: Chronic right shoulder and right leg pain.   Neuro: No  "depression. Still some sleep difficulty.  All other systems are negative.    PFSH:  She is . She chews betel nut without tobacco. Reviewed as below.     TOBACCO USE:  Social History     Tobacco Use   Smoking Status Current Some Day Smoker   Smokeless Tobacco Current User     Types: Snuff, Chew   Tobacco Comment    betel nut w/o tobacco       VITALS:  Vitals:    04/18/19 1037   BP: 110/68   Pulse: 78   Resp: 18   Temp: 97.4  F (36.3  C)   TempSrc: Oral   SpO2: 99%   Weight: 133 lb 2 oz (60.4 kg)   Height: 4' 9.5\" (1.461 m)     Wt Readings from Last 3 Encounters:   04/18/19 133 lb 2 oz (60.4 kg)   12/18/18 134 lb 8 oz (61 kg)   10/08/18 132 lb 12 oz (60.2 kg)     Body mass index is 28.31 kg/m .      PHYSICAL EXAM:  General: Alert, cooperative, no distress, appears stated age  HEENT: Normocephalic, without obvious abnormality, atraumatic, moist mucous membranes   Eyes: PERRL, conjunctiva/cornea clear, EOM's intact  Lungs: Clear to auscultation bilaterally, respirations unlabored  Heart: Regular rate and rhythm, S1 and S2 normal, no murmur, rub, or gallop  Musculoskeletal: No tenderness to palpation over the right deltoid  Neurologic:  A & O x 3.  No tremor, no focal findings.    strength normal and symmetric bilaterally. Normal gait.     DATA REVIEWED:  Additional History from Old Records Summarized (2): none  Decision to Obtain Records (1): none  Radiology Tests Summarized or Ordered (1): none  Labs Reviewed or Ordered (1): none  Medicine Test Summarized or Ordered (1): none  Independent Review of EKG or X-RAY(2 each): none    IHanna, am scribing for and in the presence of, Dr. Mackey.    IDr. Mackey, personally performed the services described in this documentation, as scribed by Hanna Núñez in my presence, and it is both accurate and complete.  Total amount time spent in today's visit was 25 minutes greater than 50% of this time was spent discussing chronic pain secondary to injury in her right " shoulder and leg.  Colon cancer screening and depression    MEDICATIONS:  Current Outpatient Medications   Medication Sig Dispense Refill     acetaminophen (TYLENOL) 325 MG tablet Take 2 tablets (650 mg total) by mouth 3 (three) times a day. 100 tablet 2     escitalopram oxalate (LEXAPRO) 10 MG tablet Take 1 tablet (10 mg total) by mouth daily. 30 tablet 4     naproxen (EC NAPROSYN) 500 MG EC tablet Take 1 tablet (500 mg total) by mouth 2 (two) times a day with meals. 60 tablet 4     polyethylene glycol (GLYCOLAX) 17 gram/dose powder Take 17 g by mouth daily. 1700 g 6     No current facility-administered medications for this visit.        Total Data Points: 0    Please note that this clinical encounter uses voice recognition software, there may be typographical errors present    Pt is s/p  at 2015,  2 degree laceration. No other complications.

## 2021-09-24 NOTE — ED PROVIDER NOTE - MUSCULOSKELETAL [+], MLM
Hpi Title: Evaluation of Skin Lesions Family Member: Father Year Removed: 1900 Hpi Title: Evaluation of a Skin Lesion BACK PAIN Year Removed: 1900

## 2022-01-01 NOTE — OB RN TRIAGE NOTE - CHIEF COMPLAINT QUOTE
sent from MD office 5cm, feeling painful ctx
# Admit Note #  History reviewed, issues discussed with RN, patient examined.   Patient evaluated before 24h of life.  examined at 1015 doing well, , latches, but sleepy.,   # Maternal and Birth History #  1d Female, born to a     22     year-old,    1 Para 0   -->1     mother.  Prenatal labs:  Blood type     B+    , HepBsAg  negative,   RPR  nonreactive,  HIV  negative,    Rubella  immune        GBS status   [ x ]unknown .  The pregnancy was un-complicated, ROM at [facility] for breech.h/o IUGR, maternal anxiety no meds, baby was said to need cpap and oxygen anish pink up in delivery, stable since then. sats were low initially, but responded to cpap.  The labor was un-remarkable  The birth occurred at      39-2     weeks of gestational age by     [ x ]c/sfor breech  ROM was  0    hours. Clear fluid.  Apgar    8    /     9   ; Birth weight :    2905     g  # Nursery course to date #  No significant event    # Physical Examination #  General Appearance: comfortable, no distress, no dysmorphic features   Head: normocephalic, anterior fontanelle open and flat  Eyes: red reflex present bilaterally   ENT: pinnae well-formed, nasal septum midline, palate intact  Neck/clavicles: no masses, no crepitus  Chest: no grunting, flaring or retractions, clear and equal breath sounds bilaterally, good air entry  Heart: RRR, normal S1 S2, no murmur  Abdomen: soft, nontender, nondistended, no masses  :  normal female    Back: no defects  Extremities: full range of motion, hips stable, normal digits. Well-perfused, 2+ Femoral pulses  Neuro: good tone, moves all extremities, symmetric Bartolome; suck, grasp reflexes intact  Skin: no lesions, no jaundice,red patch nape  # Measurements #  Vital signs: stable  # Studies #  Blood type: n/a  Cord bilirubin: n/a    # Assessment #  Well  Female, [  ]VD   [x  ]c/s  Appropriate for gestational age  breech  gbs unknown  breech0 hip sono at 4-6 weeks  # Plan #  Admit to well-baby nursery  Hep B vaccine [  ]yes   [ x ]no, after discussion with parents  Routine [facility]  recommend serial hip exams, and HIP U/S at 4 to 6 weeks

## 2022-10-28 NOTE — OB RN PATIENT PROFILE - NS PRO TALK SOMEONE YN
Pt c/o R shoulder pain x 5 days with R arm numbness and tingling x 2 days. Pt denies recent injury.
unable to assess

## 2023-08-08 NOTE — OB PROVIDER H&P - NS_CTXBYPALP_OBGYN_ALL_OB
Problem: Adult Inpatient Plan of Care  Goal: Plan of Care Review  Outcome: Ongoing, Progressing   Goal Outcome Evaluation:   Vss.Wound vac placed by wound/ostomy RN.Prn norco given as ordered for abdominal pain.Started on iv abx.No c/o n/v.                      Moderate

## 2023-12-21 ENCOUNTER — EMERGENCY (EMERGENCY)
Facility: HOSPITAL | Age: 33
LOS: 1 days | Discharge: HOME | End: 2023-12-21
Admitting: EMERGENCY MEDICINE
Payer: COMMERCIAL

## 2023-12-21 VITALS
SYSTOLIC BLOOD PRESSURE: 148 MMHG | HEART RATE: 91 BPM | TEMPERATURE: 98 F | DIASTOLIC BLOOD PRESSURE: 94 MMHG | RESPIRATION RATE: 16 BRPM | OXYGEN SATURATION: 99 %

## 2023-12-21 PROCEDURE — 99283 EMERGENCY DEPT VISIT LOW MDM: CPT

## 2023-12-21 RX ORDER — ACETAMINOPHEN 500 MG
2 TABLET ORAL
Qty: 30 | Refills: 0
Start: 2023-12-21

## 2023-12-21 RX ORDER — NEOMYCIN/POLYMYXIN B/HYDROCORT
4 SUSPENSION, DROPS(FINAL DOSAGE FORM)(ML) OTIC (EAR) ONCE
Refills: 0 | Status: COMPLETED | OUTPATIENT
Start: 2023-12-21 | End: 2023-12-21

## 2023-12-21 RX ORDER — IBUPROFEN 200 MG
600 TABLET ORAL ONCE
Refills: 0 | Status: COMPLETED | OUTPATIENT
Start: 2023-12-21 | End: 2023-12-21

## 2023-12-21 RX ORDER — IBUPROFEN 200 MG
1 TABLET ORAL
Qty: 25 | Refills: 0
Start: 2023-12-21

## 2023-12-21 RX ORDER — ACETAMINOPHEN 500 MG
650 TABLET ORAL ONCE
Refills: 0 | Status: COMPLETED | OUTPATIENT
Start: 2023-12-21 | End: 2023-12-21

## 2023-12-21 RX ORDER — NEOMYCIN/POLYMYXIN B/HYDROCORT
2 SUSPENSION, DROPS(FINAL DOSAGE FORM)(ML) OTIC (EAR) ONCE
Refills: 0 | Status: DISCONTINUED | OUTPATIENT
Start: 2023-12-21 | End: 2023-12-21

## 2023-12-21 RX ADMIN — Medication 1 TABLET(S): at 04:10

## 2023-12-21 RX ADMIN — Medication 600 MILLIGRAM(S): at 04:10

## 2023-12-21 RX ADMIN — Medication 650 MILLIGRAM(S): at 04:10

## 2023-12-21 RX ADMIN — Medication 4 DROP(S): at 04:28

## 2023-12-21 NOTE — ED PROVIDER NOTE - CLINICAL SUMMARY MEDICAL DECISION MAKING FREE TEXT BOX
34 y/o F presenting with b/l ear pain associated with sore throat and nasal congestion x 2 days. on exam, erythema to b/l TM. symptoms likely due to otitis media. plan for analgesic, augmentin, abx ear drops. 32 y/o F presenting with b/l ear pain associated with sore throat and nasal congestion x 2 days. on exam, erythema to b/l TM. symptoms likely due to otitis media. plan for analgesic, augmentin, abx ear drops.

## 2023-12-21 NOTE — ED ADULT TRIAGE NOTE - NS ED NURSE BANDS TYPE
Gabapentin 100mg      Last Written Prescription Date: 10/14/16  Last Quantity: 270, # refills: 1  Last Office Visit with G, P or [facility] prescribing provider: 3/13/17       Creatinine   Date Value Ref Range Status   08/20/2015 0.73 0.66 - 1.25 mg/dL Final     Lab Results   Component Value Date    AST 27 12/27/2012     Lab Results   Component Value Date    ALT 40 12/27/2012     BP Readings from Last 3 Encounters:   03/13/17 106/60   12/08/16 120/72   09/30/16 137/80     Thank You-  Kandis Anna, [facility]      Name band;

## 2023-12-21 NOTE — ED PROVIDER NOTE - OBJECTIVE STATEMENT
34 y/o F presenting with b/l ear pain associated with sore throat and nasal congestion x 2 days. no associated fever, chills, neck stiffness, chest pain, SOB.

## 2023-12-21 NOTE — ED PROVIDER NOTE - ADDITIONAL NOTES AND INSTRUCTIONS:
Please excuse the absence of Ms. Martinez. She was evaluated in the emergency room. She may return to work on 12/24/2023.

## 2023-12-21 NOTE — ED PROVIDER NOTE - ENMT, MLM
ear: erythema to b/l TM. Airway patent, Nasal mucosa clear. Mouth with normal mucosa. Throat has no vesicles, no oropharyngeal exudates and uvula is midline.

## 2023-12-21 NOTE — ED PROVIDER NOTE - NSFOLLOWUPINSTRUCTIONS_ED_ALL_ED_FT
Otitis Media, Adult  An ear, with close-ups of a normal ear and an ear filled with fluid.  Otitis media occurs when there is inflammation and fluid in the middle ear with signs and symptoms of an acute infection. The middle ear is a part of the ear that contains bones for hearing as well as air that helps send sounds to the brain. When infected fluid builds up in this space, it causes pressure and can lead to an ear infection. The eustachian tube connects the middle ear to the back of the nose (nasopharynx) and normally allows air into the middle ear. If the eustachian tube becomes blocked, fluid can build up and become infected.    What are the causes?  This condition is caused by a blockage in the eustachian tube. This can be caused by mucus or by swelling of the tube. Problems that can cause a blockage include:  A cold or other upper respiratory infection.  Allergies.  An irritant, such as tobacco smoke.  Enlarged adenoids. The adenoids are areas of soft tissue located high in the back of the throat, behind the nose and the roof of the mouth. They are part of the body's defense system (immune system).  A mass in the nasopharynx.  Damage to the ear caused by pressure changes (barotrauma).  What increases the risk?  You are more likely to develop this condition if you:  Smoke or are exposed to tobacco smoke.  Have an opening in the roof of your mouth (cleft palate).  Have gastroesophageal reflux.  Have an immune system disorder.  What are the signs or symptoms?  Symptoms of this condition include:  Ear pain.  Fever.  Decreased hearing.  Tiredness (lethargy).  Fluid leaking from the ear, if the eardrum is ruptured or has burst.  Ringing in the ear.  How is this diagnosed?  A health care provider checks a person's ear using an otoscope. A close-up of the ear and otoscope is also shown.  This condition is diagnosed with a physical exam. During the exam, your health care provider will use an instrument called an otoscope to look in your ear and check for redness, swelling, and fluid. He or she will also ask about your symptoms.    Your health care provider may also order tests, such as:  A pneumatic otoscopy. This is a test to check the movement of the eardrum. It is done by squeezing a small amount of air into the ear.  A tympanogram. This is a test that shows how well the eardrum moves in response to air pressure in the ear canal. It provides a graph for your health care provider to review.  How is this treated?  This condition can go away on its own within 3–5 days. But if the condition is caused by a bacterial infection and does not go away on its own, or if it keeps coming back, your health care provider may:  Prescribe antibiotic medicine to treat the infection.  Prescribe or recommend medicines to control pain.  Follow these instructions at home:  Take over-the-counter and prescription medicines only as told by your health care provider.  If you were prescribed an antibiotic medicine, take it as told by your health care provider. Do not stop taking the antibiotic even if you start to feel better.  Keep all follow-up visits. This is important.  Contact a health care provider if:  You have bleeding from your nose.  There is a lump on your neck.  You are not feeling better in 5 days.  You feel worse instead of better.  Get help right away if:  You have severe pain that is not controlled with medicine.  You have swelling, redness, or pain around your ear.  You have stiffness in your neck.  A part of your face is not moving (paralyzed).  The bone behind your ear (mastoid bone) is tender when you touch it.  You develop a severe headache.  Summary  Otitis media is redness, soreness, and swelling of the middle ear, usually resulting in pain and decreased hearing.  This condition can go away on its own within 3–5 days.  If the problem does not go away in 3–5 days, your health care provider may give you medicines to treat the infection.  If you were prescribed an antibiotic medicine, take it as told by your health care provider.  Follow all instructions that were given to you by your health care provider.  This information is not intended to replace advice given to you by your health care provider. Make sure you discuss any questions you have with your health care provider.

## 2023-12-21 NOTE — ED PROVIDER NOTE - PATIENT PORTAL LINK FT
You can access the FollowMyHealth Patient Portal offered by [facility] by registering at the following website: [email or web address]. By joining [employer]’s FollowMyHealth portal, you will also be able to view your health information using other applications (apps) compatible with our system. You can access the FollowMyHealth Patient Portal offered by [facility] by registering at the following website: [email or web address]. By joining [employer]’s FollowMyHealth portal, you will also be able to view your health information using other applications (apps) compatible with our system.

## 2024-09-12 NOTE — OB RN DELIVERY SUMMARY - NS_SEPSISRSKCALC_OBGYN_ALL_OB_FT
85 EOS calculated successfully. EOS Risk Factor: 0.5/1000 live births ([facility] national incidence); GA=40w6d; Temp=98.2; RCT=607.733; GBS='Negative'; Antibiotics='No antibiotics or any antibiotics < 2 hrs prior to birth'

## 2024-12-30 NOTE — OB RN PATIENT PROFILE - BREAST MILK PROVIDES PROTECTION AGAINST INFECTION
Assistance OOB with selected safe patient handling equipment if applicable/Communicate risk of Fall with Harm to all staff, patient, and family/Monitor gait and stability/Provide patient with walking aids/Provide visual cue: red socks, yellow wristband, yellow gown, etc/Reinforce activity limits and safety measures with patient and family/Bed in lowest position, wheels locked, appropriate side rails in place/Call bell, personal items and telephone in reach/Instruct patient to call for assistance before getting out of bed/chair/stretcher/Non-slip footwear applied when patient is off stretcher/[place] to call system/Physically safe environment - no spills, clutter or unnecessary equipment/Purposeful Proactive Rounding/Room/bathroom lighting operational, light cord in reach
Statement Selected

## 2025-03-30 NOTE — OB PROVIDER TRIAGE NOTE - NSPREVIOUSLIVEBIR_OBGYN_ALL_OB
Appears to have used Protonix and omeprazole for a period at SNF along with viscious po agents without symptomatic relief that has progressed to N/V with liquids and solids. Associated acute anemia 7.7 with slow decrease from baseline 10, but without overt bleeding, melena, coffee ground emesis. Ddx includes gastric or duodenal ulcer, esophageal webbing, GERD associated with hiatal hernia. Low suspicion for Megan-Osorio tear given pt's stable BP.    - IV protonix BID  - sucralfate before and after meals  - CLD with continued monitoring  - Zofran  - qd CBC  - Appreciated GI evaluation for EGD, noting recent C-spinal fusion as possible complicating factor for scope.     No